# Patient Record
Sex: FEMALE | Race: BLACK OR AFRICAN AMERICAN | Employment: UNEMPLOYED | URBAN - METROPOLITAN AREA
[De-identification: names, ages, dates, MRNs, and addresses within clinical notes are randomized per-mention and may not be internally consistent; named-entity substitution may affect disease eponyms.]

---

## 2018-04-05 PROCEDURE — 99282 EMERGENCY DEPT VISIT SF MDM: CPT

## 2018-04-06 ENCOUNTER — APPOINTMENT (OUTPATIENT)
Dept: GENERAL RADIOLOGY | Age: 48
End: 2018-04-06
Attending: EMERGENCY MEDICINE
Payer: SELF-PAY

## 2018-04-06 ENCOUNTER — HOSPITAL ENCOUNTER (EMERGENCY)
Age: 48
Discharge: HOME | End: 2018-04-06
Attending: EMERGENCY MEDICINE
Payer: SELF-PAY

## 2018-04-06 VITALS
OXYGEN SATURATION: 99 % | HEART RATE: 110 BPM | SYSTOLIC BLOOD PRESSURE: 153 MMHG | RESPIRATION RATE: 17 BRPM | WEIGHT: 167 LBS | TEMPERATURE: 98.9 F | DIASTOLIC BLOOD PRESSURE: 100 MMHG

## 2018-04-06 DIAGNOSIS — S76.019A HIP STRAIN, INITIAL ENCOUNTER: Primary | ICD-10-CM

## 2018-04-06 PROCEDURE — 74011250637 HC RX REV CODE- 250/637: Performed by: EMERGENCY MEDICINE

## 2018-04-06 PROCEDURE — 73502 X-RAY EXAM HIP UNI 2-3 VIEWS: CPT

## 2018-04-06 RX ORDER — NAPROXEN 500 MG/1
500 TABLET ORAL 2 TIMES DAILY WITH MEALS
Qty: 20 TAB | Refills: 0 | Status: SHIPPED | OUTPATIENT
Start: 2018-04-06 | End: 2018-04-12

## 2018-04-06 RX ORDER — IBUPROFEN 400 MG/1
800 TABLET ORAL
Status: COMPLETED | OUTPATIENT
Start: 2018-04-06 | End: 2018-04-06

## 2018-04-06 RX ADMIN — IBUPROFEN 800 MG: 400 TABLET ORAL at 03:48

## 2018-04-06 NOTE — DISCHARGE INSTRUCTIONS
Hip Pain: Care Instructions  Your Care Instructions    Hip pain may be caused by many things, including overuse, a fall, or a twisting movement. Another cause of hip pain is arthritis. Your pain may increase when you stand up, walk, or squat. The pain may come and go or may be constant. Home treatment can help relieve hip pain, swelling, and stiffness. If your pain is ongoing, you may need more tests and treatment. Follow-up care is a key part of your treatment and safety. Be sure to make and go to all appointments, and call your doctor if you are having problems. It's also a good idea to know your test results and keep a list of the medicines you take. How can you care for yourself at home? · Take pain medicines exactly as directed. ¨ If the doctor gave you a prescription medicine for pain, take it as prescribed. ¨ If you are not taking a prescription pain medicine, ask your doctor if you can take an over-the-counter medicine. · Rest and protect your hip. Take a break from any activity, including standing or walking, that may cause pain. · Put ice or a cold pack against your hip for 10 to 20 minutes at a time. Try to do this every 1 to 2 hours for the next 3 days (when you are awake) or until the swelling goes down. Put a thin cloth between the ice and your skin. · Sleep on your healthy side with a pillow between your knees, or sleep on your back with pillows under your knees. · If there is no swelling, you can put moist heat, a heating pad, or a warm cloth on your hip. Do gentle stretching exercises to help keep your hip flexible. · Learn how to prevent falls. Have your vision and hearing checked regularly. Wear slippers or shoes with a nonskid sole. · Stay at a healthy weight. · Wear comfortable shoes. When should you call for help? Call 911 anytime you think you may need emergency care. For example, call if:  ? · You have sudden chest pain and shortness of breath, or you cough up blood.    ? · You are not able to stand or walk or bear weight. ? · Your buttocks, legs, or feet feel numb or tingly. ? · Your leg or foot is cool or pale or changes color. ? · You have severe pain. ?Call your doctor now or seek immediate medical care if:  ? · You have signs of infection, such as:  ¨ Increased pain, swelling, warmth, or redness in the hip area. ¨ Red streaks leading from the hip area. ¨ Pus draining from the hip area. ¨ A fever. ? · You have signs of a blood clot, such as:  ¨ Pain in your calf, back of the knee, thigh, or groin. ¨ Redness and swelling in your leg or groin. ? · You are not able to bend, straighten, or move your leg normally. ? · You have trouble urinating or having bowel movements. ? Watch closely for changes in your health, and be sure to contact your doctor if:  ? · You do not get better as expected. Where can you learn more? Go to http://cindy-vale.info/. Enter U424 in the search box to learn more about \"Hip Pain: Care Instructions. \"  Current as of: March 20, 2017  Content Version: 11.4  © 2314-7709 [employer]. Care instructions adapted under license by [employer] (which disclaims liability or warranty for this information). If you have questions about a medical condition or this instruction, always ask your healthcare professional. [place] [address] any warranty or liability for your use of this information.

## 2018-04-06 NOTE — ED TRIAGE NOTES
Left hip pain from fall down stairs in January. States she fell down 8 or 9 stairs.    Pt states due to pain her sugar is elevated

## 2018-04-06 NOTE — ED PROVIDER NOTES
EMERGENCY DEPARTMENT HISTORY AND PHYSICAL EXAM    1:34 AM      Date: 4/6/2018  Patient Name: Missy Meneses    History of Presenting Illness     No chief complaint on file. History Provided By: Patient    Chief Complaint: Hip pain   Duration: approx 4 Months  Timing:  Constant and Worsening  Location: Left sided  Modifying Factors: Has been taking Tylenol, applying patches and Epsom salt with no relief  Associated Symptoms: denies any other associated signs or symptoms      Additional History (Context): Missy Meneses is a 41 y.o. female with a pertinent history of DM, neuropathy, presenting to the ED c/o constant, left sided, hip pain x approx 4 months, worsening today. States she is prone to falls due to her diabetic neuropathy. Her most recent fall was approx 4 months ago. Has been taking Tylenol, applying patches and Epsom salt with no relief. No other acute symptoms or complaints were noted. PCP: None        Past History     Past Medical History:  Past Medical History:   Diagnosis Date    Diabetes [facility])        Past Surgical History:  No past surgical history on file. Family History:  No family history on file. Social History:  Social History   Substance Use Topics    Smoking status: Not on file    Smokeless tobacco: Not on file    Alcohol use Not on file       Allergies: Allergies not on file      Review of Systems       Review of Systems   Constitutional: Negative for chills and fever. Respiratory: Negative for shortness of breath. Cardiovascular: Negative for chest pain. Gastrointestinal: Negative for diarrhea, nausea and vomiting. Musculoskeletal:        L sided hip pain    All other systems reviewed and are negative. Physical Exam     Visit Vitals    /90 (BP 1 Location: Left arm)    Pulse (!) 118    Temp 98.9 °F (37.2 °C)    Resp 18    Wt 75.8 kg (167 lb)    SpO2 99%         Physical Exam   Constitutional: She is oriented to person, place, and time.  She appears well-developed and well-nourished. No distress. HENT:   Head: Normocephalic and atraumatic. Eyes: Conjunctivae and EOM are normal. Right eye exhibits no discharge. Left eye exhibits no discharge. No scleral icterus. Neck: Normal range of motion. Neck supple. No tracheal deviation present. Cardiovascular: Normal rate, regular rhythm and normal heart sounds. No murmur heard. Pulmonary/Chest: Effort normal and breath sounds normal. No respiratory distress. She has no wheezes. She has no rales. Abdominal: Soft. She exhibits no distension. There is no tenderness. There is no rebound and no guarding. Musculoskeletal: Normal range of motion. She exhibits no edema or deformity. Mild TTP left lower back   Neurological: She is alert and oriented to person, place, and time. No cranial nerve deficit. Skin: Skin is warm and dry. She is not diaphoretic. Psychiatric: She has a normal mood and affect. Her behavior is normal. Judgment and thought content normal.         Diagnostic Study Results     Labs -  No results found for this or any previous visit (from the past 12 hour(s)). Radiologic Studies -   XR HIP LT W OR WO PELV 2-3 VWS    (Results Pending)         Medical Decision Making   I am the first provider for this patient. I reviewed the vital signs, available nursing notes, past medical history, past surgical history, family history and social history. Vital Signs-Reviewed the patient's vital signs. Pulse Oximetry Analysis -  99% on room air (Interpretation)    Records Reviewed: Nursing Notes (Time of Review: 1:34 AM)    ED Course: Progress Notes, Reevaluation, and Consults:    Provider Notes (Medical Decision Making): Pt with persistent left low back and hip pain after injury 4 months ago. No acute findings on XR. Recommended pain control with Naprosyn and orthopedic f/u. Diagnosis     Clinical Impression:   1.  Hip strain, initial encounter        Disposition: Discharged Follow-up Information     Follow up With Details Comments [address] [facility] Lin Reyes Schedule an appointment as soon as possible for a visit  [address], [address] [facility] [address]    [facility]  If symptoms worsen [address] [facility] [address]  [phone]           Patient's Medications   Start Taking    NAPROXEN (NAPROSYN) 500 MG TABLET    Take 1 Tab by mouth two (2) times daily (with meals) for 10 days. Continue Taking    No medications on file   These Medications have changed    No medications on file   Stop Taking    No medications on file     _______________________________    Attestations:  Scribe Attestation     Neha Sahni acting as a scribe for and in the presence of Trudi Mo MD     April 06, 2018 at 1:34 AM       Provider Attestation:      I personally performed the services described in the documentation, reviewed the documentation, as recorded by the scribe in my presence, and it accurately and completely records my words and actions.  April 06, 2018 at 1:34 AM - Trudi Mo MD    _______________________________

## 2018-04-12 ENCOUNTER — OFFICE VISIT (OUTPATIENT)
Dept: FAMILY MEDICINE CLINIC | Facility: CLINIC | Age: 48
End: 2018-04-12

## 2018-04-12 VITALS
BODY MASS INDEX: 27.8 KG/M2 | SYSTOLIC BLOOD PRESSURE: 143 MMHG | WEIGHT: 173 LBS | OXYGEN SATURATION: 100 % | HEART RATE: 104 BPM | RESPIRATION RATE: 14 BRPM | DIASTOLIC BLOOD PRESSURE: 84 MMHG | HEIGHT: 66 IN | TEMPERATURE: 98.1 F

## 2018-04-12 DIAGNOSIS — E11.21 TYPE II DIABETES MELLITUS WITH NEPHROPATHY (HCC): Primary | ICD-10-CM

## 2018-04-12 DIAGNOSIS — R80.9 MICROALBUMINURIA: ICD-10-CM

## 2018-04-12 DIAGNOSIS — I10 HTN, GOAL BELOW 130/80: ICD-10-CM

## 2018-04-12 DIAGNOSIS — Z76.89 ENCOUNTER TO ESTABLISH CARE: ICD-10-CM

## 2018-04-12 DIAGNOSIS — M25.552 HIP PAIN, LEFT: ICD-10-CM

## 2018-04-12 DIAGNOSIS — G47.00 INSOMNIA, UNSPECIFIED TYPE: ICD-10-CM

## 2018-04-12 LAB
MICROALBUMIN UR TEST STR-MCNC: 150 MG/L
MICROALBUMIN/CREAT RATIO POC: >300 MG/G

## 2018-04-12 RX ORDER — CLONAZEPAM 1 MG/1
TABLET ORAL 2 TIMES DAILY
COMMUNITY
End: 2018-04-16 | Stop reason: SDUPTHER

## 2018-04-12 RX ORDER — DULOXETIN HYDROCHLORIDE 60 MG/1
60 CAPSULE, DELAYED RELEASE ORAL DAILY
Qty: 30 CAP | Refills: 3 | Status: SHIPPED | OUTPATIENT
Start: 2018-04-12

## 2018-04-12 RX ORDER — TRIAMCINOLONE ACETONIDE 1 MG/G
OINTMENT TOPICAL 2 TIMES DAILY
Qty: 30 G | Refills: 0 | Status: SHIPPED | OUTPATIENT
Start: 2018-04-12

## 2018-04-12 RX ORDER — AMLODIPINE BESYLATE 10 MG/1
10 TABLET ORAL DAILY
Qty: 30 TAB | Refills: 3 | Status: SHIPPED | OUTPATIENT
Start: 2018-04-12 | End: 2018-06-13

## 2018-04-12 RX ORDER — INSULIN GLARGINE 100 [IU]/ML
50 INJECTION, SOLUTION SUBCUTANEOUS
Qty: 5 ADJUSTABLE DOSE PRE-FILLED PEN SYRINGE | Refills: 11 | Status: SHIPPED | OUTPATIENT
Start: 2018-04-12

## 2018-04-12 RX ORDER — HYDROCODONE BITARTRATE AND ACETAMINOPHEN 7.5; 325 MG/1; MG/1
TABLET ORAL
COMMUNITY
End: 2018-04-16

## 2018-04-12 RX ORDER — TRAZODONE HYDROCHLORIDE 50 MG/1
50 TABLET ORAL
Qty: 30 TAB | Refills: 3 | Status: SHIPPED | OUTPATIENT
Start: 2018-04-12 | End: 2018-04-16

## 2018-04-12 RX ORDER — DICLOFENAC EPOLAMINE 0.01 G/1
1 PATCH TOPICAL EVERY 12 HOURS
COMMUNITY

## 2018-04-12 RX ORDER — GLIPIZIDE 10 MG/1
10 TABLET ORAL 2 TIMES DAILY
Qty: 60 TAB | Refills: 3 | Status: SHIPPED | OUTPATIENT
Start: 2018-04-12 | End: 2018-06-13 | Stop reason: SDUPTHER

## 2018-04-12 RX ORDER — INSULIN GLARGINE 100 [IU]/ML
45 INJECTION, SOLUTION SUBCUTANEOUS
COMMUNITY
End: 2018-04-12 | Stop reason: SDUPTHER

## 2018-04-12 RX ORDER — GABAPENTIN 600 MG/1
TABLET ORAL 3 TIMES DAILY
COMMUNITY
End: 2018-04-16

## 2018-04-12 RX ORDER — METHOCARBAMOL 500 MG/1
500 TABLET, FILM COATED ORAL 3 TIMES DAILY
Qty: 90 TAB | Refills: 3 | Status: SHIPPED | OUTPATIENT
Start: 2018-04-12 | End: 2018-05-09 | Stop reason: ALTCHOICE

## 2018-04-12 NOTE — PROGRESS NOTES
HISTORY OF PRESENT ILLNESS  Merlinda Busman is a 41 y.o. female. HPI Comments: New pt to practice. Hip pain: c/o left hip pain x 4 months. Reports falling down 3 flights of stairs 4 months ago. Recent ED visit with negative left hip xray. She has tried OTC Nsaids with no relief. Denies radiation of pain. HTN: reports she is not hypertensive. Not currently medicated. DM: unstable, reports med compliance with insulin. She has seen an endocrinologist in the past in [place]. States \" was on the wrong insulin for years\". Not compliant with home BG monitoring. H/o DM neuropathy, has tried gabapentin in the past with no relief. C/o trouble staying asleep. States \" I have not slept in 3 months\", she has not tried any treatment in the past       Establish Care   The history is provided by the patient. This is a new problem. Hip Injury    The history is provided by the patient. This is a new problem. The current episode started more than 1 week ago. The problem occurs constantly. The problem has not changed since onset. The pain is present in the left hip. The quality of the pain is described as aching. The pain is at a severity of 8/10. The pain is moderate. Associated symptoms include stiffness and tingling (numbness to both feet). Pertinent negatives include no numbness. The symptoms are aggravated by movement. She has tried OTC pain medications for the symptoms. The treatment provided no relief. There has been a history of trauma. Diabetes   The history is provided by the patient. This is a chronic problem. The current episode started more than 1 week ago. The problem occurs constantly. The problem has been rapidly worsening. Treatments tried: lantus. The treatment provided no relief. Review of Systems   Constitutional: Negative. HENT: Negative. Eyes: Negative. Respiratory: Negative. Cardiovascular: Negative. Genitourinary: Negative. Musculoskeletal: Positive for stiffness. Neurological: Positive for tingling (numbness to both feet). Negative for numbness. Psychiatric/Behavioral: Negative. Past Medical History:   Diagnosis Date    Anxiety     Diabetes ([address]     Miscarriage     x 2    Neuropathy     Stillborn, abnormal      History reviewed. No pertinent surgical history. No current outpatient prescriptions on file prior to visit. No current facility-administered medications on file prior to visit. Allergies and Intolerances: Allergies   Allergen Reactions    Lyrica [Pregabalin] Anaphylaxis     suicidal thoughts    Ibuprofen Swelling     legs       Family History:   Family History   Problem Relation Age of Onset    Diabetes Mother     Heart Attack Mother     Cancer Father     Diabetes Sister     Diabetes Maternal Grandmother     Diabetes Paternal Grandmother     Arthritis-rheumatoid Paternal Grandmother        Social History:   She  reports that she has been smoking. She has never used smokeless tobacco. She  reports that she drinks alcohol. Vitals:   Visit Vitals    /84    Pulse (!) 104    Temp 98.1 °F (36.7 °C)    Resp 14    Ht 5' 5.5\" (1.664 m)    Wt 173 lb (78.5 kg)    LMP 04/07/2018    SpO2 100%    BMI 28.35 kg/m2     Body surface area is 1.9 meters squared.   Recent Results (from the past 12 hour(s))   AMB POC URINE, MICROALBUMIN, SEMIQUANTITATIVE    Collection Time: 04/12/18  3:00 PM   Result Value Ref Range    Microalbumin urine (POC) 150 MG/L    Microalbumin/creat ratio (POC) >300 <30 MG/G     Recent Results (from the past 24 hour(s))   AMB POC URINE, MICROALBUMIN, SEMIQUANTITATIVE    Collection Time: 04/12/18  3:00 PM   Result Value Ref Range    Microalbumin urine (POC) 150 MG/L    Microalbumin/creat ratio (POC) >300 <30 MG/G   AMB POC GLUCOSE, QUANTITATIVE, BLOOD    Collection Time: 04/12/18  3:30 PM   Result Value Ref Range    Glucose POC Hi mg/dL     Diabetic foot exam:     Left: Filament test absent sensation with micro filament   Pulse DP: 2+ (normal)   Pulse PT: 2+ (normal)   Deformities: Yes - callus, cracked heel  Right: Filament test 2/6   Pulse DP: 2+ (normal)   Pulse PT: 2+ (normal)   Deformities: None        Physical Exam   Constitutional: She is oriented to person, place, and time. She appears well-developed and well-nourished. HENT:   Head: Atraumatic. Cardiovascular: Normal rate. Pulmonary/Chest: Effort normal and breath sounds normal.   Musculoskeletal:        Left hip: She exhibits tenderness. She exhibits normal range of motion, normal strength, no bony tenderness and no swelling. Neurological: She is alert and oriented to person, place, and time. Skin: Skin is warm. Psychiatric: She has a normal mood and affect. Her behavior is normal.   Nursing note and vitals reviewed. ASSESSMENT and PLAN    ICD-10-CM ICD-9-CM    1. Type II diabetes mellitus with nephropathy (HCC) E11.21 250.40 AMB POC URINE, MICROALBUMIN, SEMIQUANTITATIVE     583.81 REFERRAL TO NEPHROLOGY      insulin nph-regular human rec (HUMULIN 70/30 U-100 KWIKPEN) 100 unit/mL (70-30) inpn      insulin glargine (LANTUS SOLOSTAR U-100 INSULIN) 100 unit/mL (3 mL) inpn      glipiZIDE (GLUCOTROL) 10 mg tablet      AMB POC GLUCOSE, QUANTITATIVE, BLOOD      HM DIABETES FOOT EXAM      CANCELED: AMB POC HEMOGLOBIN A1C   2. HTN, goal below 130/80 I10 401.9 amLODIPine (NORVASC) 10 mg tablet   3. Encounter to establish care Z76.89 V65.8    4. Microalbuminuria R80.9 791.0 REFERRAL TO NEPHROLOGY   5. Hip pain, left M25.552 719.45 methocarbamol (ROBAXIN) 500 mg tablet      REFERRAL TO ORTHOPEDICS   6. Insomnia, unspecified type G47.00 780.52 traZODone (DESYREL) 50 mg tablet   7.  DM type 2, uncontrolled, with neuropathy (HCC) E11.40 250.62 DULoxetine (CYMBALTA) 60 mg capsule    E11.65 357.2 AMB POC GLUCOSE, QUANTITATIVE, BLOOD      HM DIABETES FOOT EXAM     Follow-up Disposition:  Return in about 4 weeks (around 5/10/2018), or if symptoms worsen or fail to improve, for HTN, DM.  lab results and schedule of future lab studies reviewed with patient  cardiovascular risk and specific lipid/LDL goals reviewed  reviewed medications and side effects in detail  specific diabetic recommendations: diabetic diet discussed in detail, written exchange diet given, low cholesterol diet, weight control and daily exercise discussed, home glucose monitoring emphasized, all medications, side effects and compliance discussed carefully, use and side effects of insulin is taught, foot care discussed and Podiatry visits discussed, annual eye examinations at Ophthalmology discussed, glycohemoglobin and other lab monitoring discussed and long term diabetic complications discussed     - Alarm signals discussed. ER precautions  - Plan of care reviewed with patient. Understanding verbalized and they are in agreement with plan of care.

## 2018-04-12 NOTE — MR AVS SNAPSHOT
Lindsey Price 
 
 
 [address] [facility] [address] [facility] [address] 
[phone] Patient: Brent Cuevas MRN: [id number] Visit Information Date & Time Provider Department Dept. Phone Encounter #  
 4/12/2018  2:30 PM Chandan Mullins NP [employer] [phone] Follow-up Instructions Return in about 4 weeks (around 5/10/2018), or if symptoms worsen or fail to improve, for HTN, DM. Upcoming Health Maintenance Date Due DTaP/Tdap/Td series (1 - Tdap) 8/20/1993 PAP AKA CERVICAL CYTOLOGY 8/20/1993 Influenza Age 5 to Adult 8/1/2017 Allergies as of 4/12/2018  Review Complete On: 4/12/2018 By: Azael Rasmussen Severity Noted Reaction Type Reactions Lyrica [Pregabalin] High 04/12/2018    Anaphylaxis  
 suicidal thoughts Ibuprofen  04/12/2018    Swelling  
 legs Current Immunizations  Never Reviewed No immunizations on file. Not reviewed this visit You Were Diagnosed With   
  
 Codes Comments Type II diabetes mellitus with nephropathy (HCC)    -  Primary ICD-10-CM: E11.21 
ICD-9-CM: 250.40, 583.81   
 HTN, goal below 130/80     ICD-10-CM: I10 
ICD-9-CM: 401.9 Encounter to establish care     ICD-10-CM: Z76.89 
ICD-9-CM: V65.8 Microalbuminuria     ICD-10-CM: R80.9 ICD-9-CM: 791.0 Hip pain, left     ICD-10-CM: M25.552 ICD-9-CM: 719.45 Insomnia, unspecified type     ICD-10-CM: G47.00 ICD-9-CM: 780.52 Neuropathy     ICD-10-CM: G62.9 ICD-9-CM: 547. 9 Vitals BP Pulse Temp Resp Height(growth percentile) Weight(growth percentile) 143/84 (!) 104 98.1 °F (36.7 °C) 14 5' 5.5\" (1.664 m) 173 lb (78.5 kg) LMP SpO2 BMI OB Status Smoking Status 04/07/2018 100% 28.35 kg/m2 Having regular periods Current Every Day Smoker Vitals History BMI and BSA Data Body Mass Index Body Surface Area  
 28.35 kg/m 2 1.9 m 2 Preferred Pharmacy Pharmacy Name Phone [facility] [address] [facility] [place], [address] [facility] [phone] Your Updated Medication List  
  
   
This list is accurate as of 4/12/18  3:37 PM.  Always use your most recent med list. amLODIPine 10 mg tablet Commonly known as:  Dane Raddle Take 1 Tab by mouth daily. diclofenac 1.3 % Pt12 Commonly known as:  FLECTOR  
1 Patch by TransDERmal route every twelve (12) hours every twelve (12) hours. DULoxetine 60 mg capsule Commonly known as:  CYMBALTA Take 1 Cap by mouth daily. gabapentin 600 mg tablet Commonly known as:  NEURONTIN Take  by mouth three (3) times daily. glipiZIDE 10 mg tablet Commonly known as:  [employer] Take 1 Tab by mouth two (2) times a day. HYDROcodone-acetaminophen 7.5-325 mg per tablet Commonly known as:  Leisa Orozco Take  by mouth. insulin glargine 100 unit/mL (3 mL) Inpn Commonly known as:  LANTUS SOLOSTAR U-100 INSULIN  
50 Units by SubCUTAneous route nightly. insulin nph-regular human rec 100 unit/mL (70-30) Inpn Commonly known as:  HumuLIN 70/30 U-100 KwikPen 15 Units by SubCUTAneous route two (2) times a day and at bedtime. KlonoPIN 1 mg tablet Generic drug:  clonazePAM  
Take  by mouth two (2) times a day. methocarbamol 500 mg tablet Commonly known as:  ROBAXIN Take 1 Tab by mouth three (3) times daily. traZODone 50 mg tablet Commonly known as:  Cathy Britta Take 1 Tab by mouth nightly. triamcinolone acetonide 0.1 % ointment Commonly known as:  KENALOG Apply  to affected area two (2) times a day. use thin layer Prescriptions Sent to Pharmacy Refills  
 insulin nph-regular human rec (HUMULIN 70/30 U-100 KWIKPEN) 100 unit/mL (70-30) inpn 11 Sig: 15 Units by SubCUTAneous route two (2) times a day and at bedtime.   
 Class: Normal  
 Pharmacy: [address] Ph #: [phone] Route: SubCUTAneous  
 insulin glargine (LANTUS SOLOSTAR U-100 INSULIN) 100 unit/mL (3 mL) inpn 11 Si Units by SubCUTAneous route nightly. Class: Normal  
 Pharmacy: [facility] [address] LIDYA BOO [address] [place] [address] [address] [facility] Ph #: [phone] Route: SubCUTAneous DULoxetine (CYMBALTA) 60 mg capsule 3 Sig: Take 1 Cap by mouth daily. Class: Normal  
 Pharmacy: [facility] [address] LIDYA BOO [address] [place] [address] [facility] Ph #: [phone] Route: Oral  
 traZODone (DESYREL) 50 mg tablet 3 Sig: Take 1 Tab by mouth nightly. Class: Normal  
 Pharmacy: [facility] [address] LIDYA BOO [address] [place] [address] [facility] Ph #: [phone] Route: Oral  
 glipiZIDE (GLUCOTROL) 10 mg tablet 3 Sig: Take 1 Tab by mouth two (2) times a day. Class: Normal  
 Pharmacy: [facility] [address] LIDYA BOO [address] [place] [address] [facility] Ph #: [phone] Route: Oral  
 triamcinolone acetonide (KENALOG) 0.1 % ointment 0 Sig: Apply  to affected area two (2) times a day. use thin layer Class: Normal  
 Pharmacy: [facility] [address] LIDYA BOO [address] [place] [address] [facility] Ph #: [phone] Route: Topical  
 amLODIPine (NORVASC) 10 mg tablet 3 Sig: Take 1 Tab by mouth daily. Class: Normal  
 Pharmacy: [facility] [address] LIDYA BOO [address] [place] [address] [facility] Ph #: [phone] Route: Oral  
 methocarbamol (ROBAXIN) 500 mg tablet 3 Sig: Take 1 Tab by mouth three (3) times daily. Class: Normal  
 Pharmacy: [facility] [address] LIDYA BOO [address] [place] [address] #: [phone] Route: Oral  
  
We Performed the Following AMB POC URINE, MICROALBUMIN, SEMIQUANTITATIVE [16757 CPT(R)] REFERRAL TO NEPHROLOGY [BZH97 Custom] Follow-up Instructions Return in about 4 weeks (around 5/10/2018), or if symptoms worsen or fail to improve, for HTN, DM. Referral Information Referral ID Referred By Referred To  
  
 [id number] Hemant Harold Beatris Goodell, MD   
   [address] [facility] [address] Phone: [phone] Fax: [phone] Visits Status Start Date End Date 1 New Request 4/12/18 4/12/19 If your referral has a status of pending review or denied, additional information will be sent to support the outcome of this decision. Patient Instructions Learning About Foot and Toenail Care Checking your loved one's feet and keeping them clean and soft can help prevent cracks and infection in the skin. This is especially important for people who have diabetes. Keeping toenails trimmed-and polished if that's what the person likes-also helps the person feel well-groomed. If the person you care for has diabetes or has foot problems, such as bad bunions and corns, think about taking them to see a podiatrist. This is a doctor who specializes in the care of the feet. Sometimes a podiatrist will come to the home if the person can't go out for visits. Try to take the person for salon pedicures if that is what they want. It's a chance to get out and see people and continue a favorite activity. You can do basic nail care at home. Usually all you need to do is keep the nails clean and at a safe length. How do you trim someone's toenails? Try to trim the person's nails every week. Or check the nails each week to see if they need to be trimmed. It's easiest to trim nails after the person has had a shower or foot bath. It makes the nails softer and easier to trim. Start by gathering your supplies. You will need toenail clippers and a nail file. You may also need nail polish and nail polish remover. To trim the nails: 
1. Wash and dry your hands. You don't need to wear gloves. 2. Use nail polish remover to take off any polish. 3. Hold the person's foot and toe steady with one hand while you trim the nail with your other hand. Trim the nails straight across. Leave the nails a little longer at the corners so that the sharp ends don't cut into the skin. 4. Keep the nails no longer than the tip of the toes. 5. Let the nails dry if they are still damp and soft. 6. Use a nail file to gently smooth the edges of the nails, especially at the corners. They may be sharp after the nails are cut straight. 7. Apply nail polish, if the person wants it. If the person's nails are thick and discolored, it may be safest to have a podiatrist cut them. What else do you need to know? When you're caring for someone's nails, it is important to remember not to trim or cut the cuticles. A minor cut in a cuticle could lead to an infection. Wash the feet daily in the shower or bath or in a basin made for washing feet. It's extra important to wash the feet carefully if the person has diabetes. After washing the feet, dry gently. Put lotion on the feet, especially on the heels. But don't put it between the toes. If the person doesn't have diabetes and you see signs of athlete's foot (such as dry, cracking, or itchy skin between the toes), you can try an over-the-counter medicine. These medicines can kill the fungus that causes athlete's foot. If the problem doesn't go away, talk to the person's doctor. Look every day for cuts or signs of infection, such as pain, swelling, redness, or warmth. If you see any of these signs-especially in someone who has diabetes-call the doctor. Where can you learn more? Go to http://cindy-vale.info/. Enter A726 in the search box to learn more about \"Learning About Foot and Toenail Care. \" Current as of: September 24, 2016 Content Version: 11.4 © 6310-7098 [employer]Out.  Care instructions adapted under license by [employer] (which disclaims liability or warranty for this information). If you have questions about a medical condition or this instruction, always ask your healthcare professional. [address] any warranty or liability for your use of this information. Diabetes Blood Sugar Emergencies: Your Action Plan How can you prevent a blood sugar emergency? An important part of living with diabetes is keeping your blood sugar in your target range. You'll need to know what to do if it's too high or too low. Managing your blood sugar levels helps you avoid emergencies. This care sheet will teach you about the signs of high and low blood sugar. It will help you make an action plan with your doctor for when these signs occur. Low blood sugar is more likely to happen if you take certain medicines for diabetes. It can also happen if you skip a meal, drink alcohol, or exercise more than usual. 
You may get high blood sugar if you eat differently than you normally do. One example is eating more carbohydrate than usual. Having a cold, the flu, or other sudden illness can also cause high blood sugar levels. Levels can also rise if you miss a dose of medicine. Any change in how you take your medicine may affect your blood sugar level. So it's important to work with your doctor before you make any changes. Check your blood sugar Work with your doctor to fill in the blank spaces below that apply to you. Track your levels, know your target range, and write down ways you can get your blood sugar back in your target range. A log book can help you track your levels. Take the book to all of your medical appointments. · Check your blood sugar _____ times a day, at these times:________________________________________________. (For example: Before meals, at bedtime, before exercise, during exercise, other.) · Your blood sugar target range before a meal is ___________________. Your blood sugar target range after a meal is _______________________. · Do dsai-___________________________________________________-tk get your blood sugar back within your safe range if your blood sugar results are _________________________________________. (For example: Less than 70 or above 250 mg/dL.) Call your doctor when your blood sugar results are ___________________________________. (For example: Less than 70 or above 250 mg/dL.) What are the symptoms of low and high blood sugar? Common symptoms of low blood sugar are sweating and feeling shaky, weak, hungry, or confused. Symptoms can start quickly. Common symptoms of high blood sugar are feeling very thirsty or very hungry. You may also pass urine more often than usual. You may have blurry vision and may lose weight without trying. But some people may have high or low blood sugar without having any symptoms. That's a good reason to check your blood sugar on a regular schedule. What should you do if you have symptoms? Work with your doctor to fill in the blank spaces below that apply to you. Low blood sugar If you have symptoms of low blood sugar, check your blood sugar. If it's below _____ ( for example, below 70), eat or drink a quick-sugar food that has about 15 grams of carbohydrate. Your goal is to get your level back to your safe range. Check your blood sugar again 15 minutes later. If it's still not in your target range, take another 15 grams of carbohydrate and check your blood sugar again in 15 minutes. Repeat this until you reach your target. Then go back to your regular testing schedule. When you have low blood sugar, it's best to stop or reduce any physical activity until your blood sugar is back in your target range and is stable. If you must stay active, eat or drink 30 grams of carbohydrate. Then check your blood sugar again in 15 minutes. If it's not in your target range, take another 30 grams of carbohydrates. Check your blood sugar again in 15 minutes. Keep doing this until you reach your target. You can then go back to your regular testing schedule. If your symptoms or blood sugar levels are getting worse or have not improved after 15 minutes, seek medical care right away. Here are some examples of quick-sugar foods with 15 grams of carbohydrate: · 3 or 4 glucose tablets · 1 tube of glucose gel · Hard candy (such as 3 Jolly Ranchers or 5 to [employer]) · ½ cup to ¾ cup (4 to 6 ounces) of fruit juice or regular (not diet) soda High blood sugar If you have symptoms of high blood sugar, check your blood sugar. Your goal is to get your level back to your target range. If it's above ______ ( for example, above 250), follow these steps: · If you missed a dose of your diabetes medicine, take it now. Take only the amount of medicine that you have been prescribed. Do not take more or less medicine. · Give yourself insulin if your doctor has prescribed it for high blood sugar. · Test for ketones, if the doctor told you to do so. If the results of the ketone test show a moderate-to-large amount of ketones, call the doctor for advice. · Wait 30 minutes after you take the extra insulin or the missed medicine. Check your blood sugar again. If your symptoms or blood sugar levels are getting worse or have not improved after taking these steps, seek medical care right away. Follow-up care is a key part of your treatment and safety. Be sure to make and go to all appointments, and call your doctor if you are having problems. It's also a good idea to know your test results and keep a list of the medicines you take. Where can you learn more? Go to http://cindy-vale.info/. Enter O281 in the search box to learn more about \"Diabetes Blood Sugar Emergencies: Your Action Plan. \" Current as of: March 13, 2017 Content Version: 11.4 © 4754-8714 Healthwise, [employer]Post.  Care instructions adapted under license by [employer]sg[employer]nr (which disclaims liability or warranty for this information). If you have questions about a medical condition or this instruction, always ask your healthcare professional. [address] any warranty or liability for your use of this information. Introducing [facility] SERVICES! [facility] introduces Chipolo patient portal. Now you can access parts of your medical record, email your doctor's office, and request medication refills online. 1. In your internet browser, go to [email or web address] 2. Click on the First Time User? Click Here link in the Sign In box. You will see the New Member Sign Up page. 3. Enter your Chipolo Access Code exactly as it appears below. You will not need to use this code after youve completed the sign-up process. If you do not sign up before the expiration date, you must request a new code. · Chipolo Access Code: [email or web address] Expires: 7/5/2018 12:26 AM 
 
4. Enter the last four digits of your Social Security Number (xxxx) and Date of Birth (mm/dd/yyyy) as indicated and click Submit. You will be taken to the next sign-up page. 5. Create a Chipolo ID. This will be your Chipolo login ID and cannot be changed, so think of one that is secure and easy to remember. 6. Create a Chipolo password. You can change your password at any time. 7. Enter your Password Reset Question and Answer. This can be used at a later time if you forget your password. 8. Enter your e-mail address. You will receive e-mail notification when new information is available in [address] 9. Click Sign Up. You can now view and download portions of your medical record. 10. Click the Download Summary menu link to download a portable copy of your medical information. If you have questions, please visit the Frequently Asked Questions section of the Chipolo website. Remember, Chipolo is NOT to be used for urgent needs. For medical emergencies, dial 911. Now available from your iPhone and Android! Please provide this summary of care documentation to your next provider. Your primary care clinician is listed as NONE. If you have any questions after today's visit, please call [phone].

## 2018-04-12 NOTE — PATIENT INSTRUCTIONS
Learning About Foot and Toenail Care  Checking your loved one's feet and keeping them clean and soft can help prevent cracks and infection in the skin. This is especially important for people who have diabetes. Keeping toenails trimmed-and polished if that's what the person likes-also helps the person feel well-groomed. If the person you care for has diabetes or has foot problems, such as bad bunions and corns, think about taking them to see a podiatrist. This is a doctor who specializes in the care of the feet. Sometimes a podiatrist will come to the home if the person can't go out for visits. Try to take the person for salon pedicures if that is what they want. It's a chance to get out and see people and continue a favorite activity. You can do basic nail care at home. Usually all you need to do is keep the nails clean and at a safe length. How do you trim someone's toenails? Try to trim the person's nails every week. Or check the nails each week to see if they need to be trimmed. It's easiest to trim nails after the person has had a shower or foot bath. It makes the nails softer and easier to trim. Start by gathering your supplies. You will need toenail clippers and a nail file. You may also need nail polish and nail polish remover. To trim the nails:  1. Wash and dry your hands. You don't need to wear gloves. 2. Use nail polish remover to take off any polish. 3. Hold the person's foot and toe steady with one hand while you trim the nail with your other hand. Trim the nails straight across. Leave the nails a little longer at the corners so that the sharp ends don't cut into the skin. 4. Keep the nails no longer than the tip of the toes. 5. Let the nails dry if they are still damp and soft. 6. Use a nail file to gently smooth the edges of the nails, especially at the corners. They may be sharp after the nails are cut straight. 7. Apply nail polish, if the person wants it.   If the person's nails are thick and discolored, it may be safest to have a podiatrist cut them. What else do you need to know? When you're caring for someone's nails, it is important to remember not to trim or cut the cuticles. A minor cut in a cuticle could lead to an infection. Wash the feet daily in the shower or bath or in a basin made for washing feet. It's extra important to wash the feet carefully if the person has diabetes. After washing the feet, dry gently. Put lotion on the feet, especially on the heels. But don't put it between the toes. If the person doesn't have diabetes and you see signs of athlete's foot (such as dry, cracking, or itchy skin between the toes), you can try an over-the-counter medicine. These medicines can kill the fungus that causes athlete's foot. If the problem doesn't go away, talk to the person's doctor. Look every day for cuts or signs of infection, such as pain, swelling, redness, or warmth. If you see any of these signs-especially in someone who has diabetes-call the doctor. Where can you learn more? Go to http://cindy-vale.info/. Enter A726 in the search box to learn more about \"Learning About Foot and Toenail Care. \"  Current as of: September 24, 2016  Content Version: 11.4  © 1304-9079 [employer]. Care instructions adapted under license by [employer] (which disclaims liability or warranty for this information). If you have questions about a medical condition or this instruction, always ask your healthcare professional. [place] [address] any warranty or liability for your use of this information. Diabetes Blood Sugar Emergencies: Your Action Plan  How can you prevent a blood sugar emergency? An important part of living with diabetes is keeping your blood sugar in your target range. You'll need to know what to do if it's too high or too low. Managing your blood sugar levels helps you avoid emergencies.  This care sheet will teach you about the signs of high and low blood sugar. It will help you make an action plan with your doctor for when these signs occur. Low blood sugar is more likely to happen if you take certain medicines for diabetes. It can also happen if you skip a meal, drink alcohol, or exercise more than usual.  You may get high blood sugar if you eat differently than you normally do. One example is eating more carbohydrate than usual. Having a cold, the flu, or other sudden illness can also cause high blood sugar levels. Levels can also rise if you miss a dose of medicine. Any change in how you take your medicine may affect your blood sugar level. So it's important to work with your doctor before you make any changes. Check your blood sugar  Work with your doctor to fill in the blank spaces below that apply to you. Track your levels, know your target range, and write down ways you can get your blood sugar back in your target range. A log book can help you track your levels. Take the book to all of your medical appointments. · Check your blood sugar _____ times a day, at these times:________________________________________________. (For example: Before meals, at bedtime, before exercise, during exercise, other.)  · Your blood sugar target range before a meal is ___________________. Your blood sugar target range after a meal is _______________________. · Do dcls-___________________________________________________-er get your blood sugar back within your safe range if your blood sugar results are _________________________________________. (For example: Less than 70 or above 250 mg/dL.)  Call your doctor when your blood sugar results are ___________________________________. (For example: Less than 70 or above 250 mg/dL.)  What are the symptoms of low and high blood sugar? Common symptoms of low blood sugar are sweating and feeling shaky, weak, hungry, or confused. Symptoms can start quickly.   Common symptoms of high blood sugar are feeling very thirsty or very hungry. You may also pass urine more often than usual. You may have blurry vision and may lose weight without trying. But some people may have high or low blood sugar without having any symptoms. That's a good reason to check your blood sugar on a regular schedule. What should you do if you have symptoms? Work with your doctor to fill in the blank spaces below that apply to you. Low blood sugar  If you have symptoms of low blood sugar, check your blood sugar. If it's below _____ ( for example, below 70), eat or drink a quick-sugar food that has about 15 grams of carbohydrate. Your goal is to get your level back to your safe range. Check your blood sugar again 15 minutes later. If it's still not in your target range, take another 15 grams of carbohydrate and check your blood sugar again in 15 minutes. Repeat this until you reach your target. Then go back to your regular testing schedule. When you have low blood sugar, it's best to stop or reduce any physical activity until your blood sugar is back in your target range and is stable. If you must stay active, eat or drink 30 grams of carbohydrate. Then check your blood sugar again in 15 minutes. If it's not in your target range, take another 30 grams of carbohydrates. Check your blood sugar again in 15 minutes. Keep doing this until you reach your target. You can then go back to your regular testing schedule. If your symptoms or blood sugar levels are getting worse or have not improved after 15 minutes, seek medical care right away. Here are some examples of quick-sugar foods with 15 grams of carbohydrate:  · 3 or 4 glucose tablets  · 1 tube of glucose gel  · Hard candy (such as 3 Jolly Ranchers or 5 to 7 Life Savers)  · ½ cup to ¾ cup (4 to 6 ounces) of fruit juice or regular (not diet) soda  High blood sugar  If you have symptoms of high blood sugar, check your blood sugar.  Your goal is to get your level back to your target range. If it's above ______ ( for example, above 250), follow these steps:  · If you missed a dose of your diabetes medicine, take it now. Take only the amount of medicine that you have been prescribed. Do not take more or less medicine. · Give yourself insulin if your doctor has prescribed it for high blood sugar. · Test for ketones, if the doctor told you to do so. If the results of the ketone test show a moderate-to-large amount of ketones, call the doctor for advice. · Wait 30 minutes after you take the extra insulin or the missed medicine. Check your blood sugar again. If your symptoms or blood sugar levels are getting worse or have not improved after taking these steps, seek medical care right away. Follow-up care is a key part of your treatment and safety. Be sure to make and go to all appointments, and call your doctor if you are having problems. It's also a good idea to know your test results and keep a list of the medicines you take. Where can you learn more? Go to http://cindy-vale.info/. Enter J459 in the search box to learn more about \"Diabetes Blood Sugar Emergencies: Your Action Plan. \"  Current as of: March 13, 2017  Content Version: 11.4  © 5105-2533 Healthwise, Incorporated. Care instructions adapted under license by [employer] (which disclaims liability or warranty for this information). If you have questions about a medical condition or this instruction, always ask your healthcare professional. [place] [address] any warranty or liability for your use of this information.

## 2018-04-12 NOTE — PROGRESS NOTES
1. Have you been to the ER, urgent care clinic since your last visit? Hospitalized since your last visit? Yes When: 04/06/18 [facility] left hip strain    2. Have you seen or consulted any other health care providers outside of the [address] [place] [address] since your last visit? Include any pap smears or colon screening.  No

## 2018-04-13 LAB — GLUCOSE POC: NORMAL MG/DL

## 2018-04-16 ENCOUNTER — OFFICE VISIT (OUTPATIENT)
Dept: FAMILY MEDICINE CLINIC | Facility: CLINIC | Age: 48
End: 2018-04-16

## 2018-04-16 VITALS
RESPIRATION RATE: 14 BRPM | SYSTOLIC BLOOD PRESSURE: 158 MMHG | OXYGEN SATURATION: 100 % | DIASTOLIC BLOOD PRESSURE: 96 MMHG | HEART RATE: 112 BPM | BODY MASS INDEX: 27.48 KG/M2 | HEIGHT: 66 IN | TEMPERATURE: 98.1 F | WEIGHT: 171 LBS

## 2018-04-16 DIAGNOSIS — T78.40XA ALLERGIC REACTION, INITIAL ENCOUNTER: ICD-10-CM

## 2018-04-16 DIAGNOSIS — G47.00 INSOMNIA, UNSPECIFIED TYPE: ICD-10-CM

## 2018-04-16 DIAGNOSIS — I10 HTN, GOAL BELOW 130/80: Primary | ICD-10-CM

## 2018-04-16 DIAGNOSIS — F41.9 ANXIETY: ICD-10-CM

## 2018-04-16 RX ORDER — CLONAZEPAM 1 MG/1
1 TABLET ORAL
Qty: 60 TAB | Refills: 0 | Status: SHIPPED | OUTPATIENT
Start: 2018-04-16 | End: 2018-05-09 | Stop reason: SDUPTHER

## 2018-04-16 RX ORDER — ZOLPIDEM TARTRATE 10 MG/1
10 TABLET ORAL
Qty: 30 TAB | Refills: 0 | Status: CANCELLED | OUTPATIENT
Start: 2018-04-16

## 2018-04-16 NOTE — MR AVS SNAPSHOT
[facility] 
 
 
 [address] [facility] [address] Located within [facility] [address] 
[phone] Patient: Karissa Ramirez MRN: [id number] Visit Information Date & Time Provider Department Dept. Phone Encounter #  
 4/16/2018  1:00 PM Yodit Brown NP [facility] [phone] [id number] Follow-up Instructions Return if symptoms worsen or fail to improve. Your Appointments 5/2/2018  2:00 PM  
New Patient with Guero Boucher MD  
[address] [facility], [address] [facility] - [facility] Appt Note: LEFT HIP PAIN/ REF BY JUAN GUZMANISON/ XRAYS-Y IN CC/*ADVISED PT TO COME EARLY W. PHOTO ID & INS. CARD, CURRENT MEDICATION LIST & DOSAGE TO THE HS LOCATION  
 [address] [place] [address], [address] [place] [address]  
[phone] [address] [address]  
  
    
 5/9/2018  2:15 PM  
ROUTINE CARE with Yodit Brown NP [facility] Main Office ([place] [employer] [facility] Appt Note: 4 week follow up appt. for HTN, DM.  
 [address] [facility] [address] [facility] [address]  
[phone]  
  
   
 [address] Upcoming Health Maintenance Date Due  
 LIPID PANEL Q1 1972 EYE EXAM RETINAL OR DILATED Q1 8/20/1982 Pneumococcal 19-64 Medium Risk (1 of 1 - PPSV23) 8/20/1991 DTaP/Tdap/Td series (1 - Tdap) 8/20/1993 PAP AKA CERVICAL CYTOLOGY 8/20/1993 Influenza Age 5 to Adult 8/1/2017 HEMOGLOBIN A1C Q6M 6/22/2018* FOOT EXAM Q1 4/12/2019 MICROALBUMIN Q1 4/12/2019 *Topic was postponed. The date shown is not the original due date. Allergies as of 4/16/2018  Review Complete On: 4/16/2018 By: Tess Villar Severity Noted Reaction Type Reactions Lyrica [Pregabalin] High 04/12/2018    Anaphylaxis  
 suicidal thoughts Ibuprofen  04/12/2018    Swelling  
 legs Trazodone  04/16/2018    Other (comments) Loose bowel movents Current Immunizations  Never Reviewed No immunizations on file. Not reviewed this visit You Were Diagnosed With   
  
 Codes Comments HTN, goal below 130/80    -  Primary ICD-10-CM: I10 
ICD-9-CM: 401.9 Insomnia, unspecified type     ICD-10-CM: G47.00 ICD-9-CM: 780.52 Allergic reaction, initial encounter     ICD-10-CM: T78.40XA ICD-9-CM: 995.3 Anxiety     ICD-10-CM: F41.9 ICD-9-CM: 300.00 Vitals BP Pulse Temp Resp Height(growth percentile) Weight(growth percentile) (!) 158/96 (!) 112 98.1 °F (36.7 °C) 14 5' 5.5\" (1.664 m) 171 lb (77.6 kg) LMP SpO2 BMI OB Status Smoking Status 04/07/2018 100% 28.02 kg/m2 Having regular periods Current Every Day Smoker Vitals History BMI and BSA Data Body Mass Index Body Surface Area 28.02 kg/m 2 1.89 m 2 Preferred Pharmacy Pharmacy Name Phone [address], [address] [place] [phone] Your Updated Medication List  
  
   
This list is accurate as of 4/16/18  1:14 PM.  Always use your most recent med list. amLODIPine 10 mg tablet Commonly known as:  Unknown [place] Take 1 Tab by mouth daily. clonazePAM 1 mg tablet Commonly known as:  Waynard Escobar Take 1 Tab by mouth two (2) times daily as needed. Max Daily Amount: 2 mg.  
  
 diclofenac 1.3 % Pt12 Commonly known as:  FLECTOR  
1 Patch by TransDERmal route every twelve (12) hours every twelve (12) hours. DULoxetine 60 mg capsule Commonly known as:  CYMBALTA Take 1 Cap by mouth daily. gabapentin 600 mg tablet Commonly known as:  NEURONTIN Take  by mouth three (3) times daily. glipiZIDE 10 mg tablet Commonly known as:  Xavier Mikey Take 1 Tab by mouth two (2) times a day. HYDROcodone-acetaminophen 7.5-325 mg per tablet Commonly known as:  Lee Marli Take  by mouth. insulin glargine 100 unit/mL (3 mL) Inpn Commonly known as:  LANTUS SOLOSTAR U-100 INSULIN  
50 Units by SubCUTAneous route nightly. insulin nph-regular human rec 100 unit/mL (70-30) Inpn Commonly known as:  HumuLIN 70/30 U-100 KwikPen 15 Units by SubCUTAneous route two (2) times a day and at bedtime. methocarbamol 500 mg tablet Commonly known as:  ROBAXIN Take 1 Tab by mouth three (3) times daily. triamcinolone acetonide 0.1 % ointment Commonly known as:  KENALOG Apply  to affected area two (2) times a day. use thin layer Prescriptions Printed Refills  
 clonazePAM (KLONOPIN) 1 mg tablet 0 Sig: Take 1 Tab by mouth two (2) times daily as needed. Max Daily Amount: 2 mg. Class: Print Route: Oral  
  
Follow-up Instructions Return if symptoms worsen or fail to improve. Patient Instructions Allergic Reaction: Care Instructions Your Care Instructions An allergic reaction is an excessive response from your immune system to a medicine, chemical, food, insect bite, or other substance. A reaction can range from mild to life-threatening. Some people have a mild rash, hives, and itching or stomach cramps. In severe reactions, swelling of your tongue and throat can close up your airway so that you cannot breathe. Follow-up care is a key part of your treatment and safety. Be sure to make and go to all appointments, and call your doctor if you are having problems. It's also a good idea to know your test results and keep a list of the medicines you take. How can you care for yourself at home? · If you know what caused your allergic reaction, be sure to avoid it. Your allergy may become more severe each time you have a reaction. · Take an over-the-counter antihistamine, such as cetirizine (Zyrtec) or loratadine (Claritin), to treat mild symptoms. Read and follow directions on the label. Some antihistamines can make you feel sleepy.  Do not give antihistamines to a child unless you have checked with your doctor first. Mild symptoms include sneezing or an itchy or runny nose; an itchy mouth; a few hives or mild itching; and mild nausea or stomach discomfort. · Do not scratch hives or a rash. Put a cold, moist towel on them or take cool baths to relieve itching. Put ice packs on hives, swelling, or insect stings for 10 to 15 minutes at a time. Put a thin cloth between the ice pack and your skin. Do not take hot baths or showers. They will make the itching worse. · Your doctor may prescribe a shot of epinephrine to carry with you in case you have a severe reaction. Learn how to give yourself the shot and keep it with you at all times. Make sure it is not . · Go to the emergency room every time you have a severe reaction, even if you have used your shot of epinephrine and are feeling better. Symptoms can come back after a shot. · Wear medical alert jewelry that lists your allergies. You can buy this at most [employer]. · If your child has a severe allergy, make sure that his or her teachers, babysitters, coaches, and other caregivers know about the allergy. They should have an epinephrine shot, know how and when to give it, and have a plan to take your child to the hospital. 
When should you call for help? Give an epinephrine shot if: 
? · You think you are having a severe allergic reaction. ? · You have symptoms in more than one body area, such as mild nausea and an itchy mouth. ? After giving an epinephrine shot call 911, even if you feel better. ?Call 911 if: 
? · You have symptoms of a severe allergic reaction. These may include: 
¨ Sudden raised, red areas (hives) all over your body. ¨ Swelling of the throat, mouth, lips, or tongue. ¨ Trouble breathing. ¨ Passing out (losing consciousness). Or you may feel very lightheaded or suddenly feel weak, confused, or restless. ? · You have been given an epinephrine shot, even if you feel better. ?Call your doctor now or seek immediate medical care if: 
? · You have symptoms of an allergic reaction, such as: ¨ A rash or hives (raised, red areas on the skin). ¨ Itching. ¨ Swelling. ¨ Belly pain, nausea, or vomiting. ? Watch closely for changes in your health, and be sure to contact your doctor if: 
? · You do not get better as expected. Where can you learn more? Go to http://cindy-vale.info/. Enter Z566 in the search box to learn more about \"Allergic Reaction: Care Instructions. \" Current as of: September 29, 2016 Content Version: 11.4 © 4661-1885 [employer]. Care instructions adapted under license by [employer] (which disclaims liability or warranty for this information). If you have questions about a medical condition or this instruction, always ask your healthcare professional. [place] [address] any warranty or liability for your use of this information. Insomnia: Care Instructions Your Care Instructions Insomnia is the inability to sleep well. It is a common problem for most people at some time. Insomnia may make it hard for you to get to sleep, stay asleep, or sleep as long as you need to. This can make you tired and grouchy during the day. It can also make you forgetful, less effective at work, and unhappy. Insomnia can be caused by conditions such as depression or anxiety. Pain can also affect your ability to sleep. When these problems are solved, the insomnia usually clears up. But sometimes bad sleep habits can cause insomnia. If insomnia is affecting your work or your enjoyment of life, you can take steps to improve your sleep. Follow-up care is a key part of your treatment and safety. Be sure to make and go to all appointments, and call your doctor if you are having problems.  It's also a good idea to know your test results and keep a list of the medicines you take. How can you care for yourself at home? What to avoid · Do not have drinks with caffeine, such as coffee or black tea, for 8 hours before bed. · Do not smoke or use other types of tobacco near bedtime. Nicotine is a stimulant and can keep you awake. · Avoid drinking alcohol late in the evening, because it can cause you to wake in the middle of the night. · Do not eat a big meal close to bedtime. If you are hungry, eat a light snack. · Do not drink a lot of water close to bedtime, because the need to urinate may wake you up during the night. · Do not read or watch TV in bed. Use the bed only for sleeping and sexual activity. What to try · Go to bed at the same time every night, and wake up at the same time every morning. Do not take naps during the day. · Keep your bedroom quiet, dark, and cool. · Sleep on a comfortable pillow and mattress. · If watching the clock makes you anxious, turn it facing away from you so you cannot see the time. · If you worry when you lie down, start a worry book. Well before bedtime, write down your worries, and then set the book and your concerns aside. · Try meditation or other relaxation techniques before you go to bed. · If you cannot fall asleep, get up and go to another room until you feel sleepy. Do something relaxing. Repeat your bedtime routine before you go to bed again. · Make your house quiet and calm about an hour before bedtime. Turn down the lights, turn off the TV, log off the computer, and turn down the volume on music. This can help you relax after a busy day. When should you call for help? Watch closely for changes in your health, and be sure to contact your doctor if: 
? · Your efforts to improve your sleep do not work. ? · Your insomnia gets worse. ? · You have been feeling down, depressed, or hopeless or have lost interest in things that you usually enjoy. Where can you learn more? Go to http://cindy-vale.info/. Enter P513 in the search box to learn more about \"Insomnia: Care Instructions. \" Current as of: July 26, 2016 Content Version: 11.4 © 7293-3019 Healthwise, [employer]. Care instructions adapted under license by [employer] (which disclaims liability or warranty for this information). If you have questions about a medical condition or this instruction, always ask your healthcare professional. [address] any warranty or liability for your use of this information. Introducing [facility]! [facility] introduces [employer] patient portal. Now you can access parts of your medical record, email your doctor's office, and request medication refills online. 1. In your internet browser, go to [email or web address] 2. Click on the First Time User? Click Here link in the Sign In box. You will see the New Member Sign Up page. 3. Enter your [employer] Access Code exactly as it appears below. You will not need to use this code after youve completed the sign-up process. If you do not sign up before the expiration date, you must request a new code. · [employer] Access Code: [email or web address] Expires: 7/5/2018 12:26 AM 
 
4. Enter the last four digits of your Social Security Number (xxxx) and Date of Birth (mm/dd/yyyy) as indicated and click Submit. You will be taken to the next sign-up page. 5. Create a [employer] ID. This will be your [employer] login ID and cannot be changed, so think of one that is secure and easy to remember. 6. Create a [employer] password. You can change your password at any time. 7. Enter your Password Reset Question and Answer. This can be used at a later time if you forget your password. 8. Enter your e-mail address. You will receive e-mail notification when new information is available in [address] 9. Click Sign Up. You can now view and download portions of your medical record. 10. Click the Download Summary menu link to download a portable copy of your medical information. If you have questions, please visit the Frequently Asked Questions section of the [employer] website. Remember, [employer] is NOT to be used for urgent needs. For medical emergencies, dial 911. Now available from your iPhone and Android! Please provide this summary of care documentation to your next provider. Your primary care clinician is listed as NONE. If you have any questions after today's visit, please call [phone].

## 2018-04-16 NOTE — PATIENT INSTRUCTIONS
Allergic Reaction: Care Instructions  Your Care Instructions    An allergic reaction is an excessive response from your immune system to a medicine, chemical, food, insect bite, or other substance. A reaction can range from mild to life-threatening. Some people have a mild rash, hives, and itching or stomach cramps. In severe reactions, swelling of your tongue and throat can close up your airway so that you cannot breathe. Follow-up care is a key part of your treatment and safety. Be sure to make and go to all appointments, and call your doctor if you are having problems. It's also a good idea to know your test results and keep a list of the medicines you take. How can you care for yourself at home? · If you know what caused your allergic reaction, be sure to avoid it. Your allergy may become more severe each time you have a reaction. · Take an over-the-counter antihistamine, such as cetirizine (Zyrtec) or loratadine (Claritin), to treat mild symptoms. Read and follow directions on the label. Some antihistamines can make you feel sleepy. Do not give antihistamines to a child unless you have checked with your doctor first. Mild symptoms include sneezing or an itchy or runny nose; an itchy mouth; a few hives or mild itching; and mild nausea or stomach discomfort. · Do not scratch hives or a rash. Put a cold, moist towel on them or take cool baths to relieve itching. Put ice packs on hives, swelling, or insect stings for 10 to 15 minutes at a time. Put a thin cloth between the ice pack and your skin. Do not take hot baths or showers. They will make the itching worse. · Your doctor may prescribe a shot of epinephrine to carry with you in case you have a severe reaction. Learn how to give yourself the shot and keep it with you at all times. Make sure it is not . · Go to the emergency room every time you have a severe reaction, even if you have used your shot of epinephrine and are feeling better. Symptoms can come back after a shot. · Wear medical alert jewelry that lists your allergies. You can buy this at most drugstores. · If your child has a severe allergy, make sure that his or her teachers, babysitters, coaches, and other caregivers know about the allergy. They should have an epinephrine shot, know how and when to give it, and have a plan to take your child to the hospital.  When should you call for help? Give an epinephrine shot if:  ? · You think you are having a severe allergic reaction. ? · You have symptoms in more than one body area, such as mild nausea and an itchy mouth. ? After giving an epinephrine shot call 911, even if you feel better. ?Call 911 if:  ? · You have symptoms of a severe allergic reaction. These may include:  ¨ Sudden raised, red areas (hives) all over your body. ¨ Swelling of the throat, mouth, lips, or tongue. ¨ Trouble breathing. ¨ Passing out (losing consciousness). Or you may feel very lightheaded or suddenly feel weak, confused, or restless. ? · You have been given an epinephrine shot, even if you feel better. ?Call your doctor now or seek immediate medical care if:  ? · You have symptoms of an allergic reaction, such as:  ¨ A rash or hives (raised, red areas on the skin). ¨ Itching. ¨ Swelling. ¨ Belly pain, nausea, or vomiting. ? Watch closely for changes in your health, and be sure to contact your doctor if:  ? · You do not get better as expected. Where can you learn more? Go to http://cindy-vale.info/. Enter M516 in the search box to learn more about \"Allergic Reaction: Care Instructions. \"  Current as of: September 29, 2016  Content Version: 11.4  © 9915-9253 [employer]. Care instructions adapted under license by [employer] (which disclaims liability or warranty for this information).  If you have questions about a medical condition or this instruction, always ask your healthcare professional. Order Mapper, [facility] disclaims any warranty or liability for your use of this information. Insomnia: Care Instructions  Your Care Instructions    Insomnia is the inability to sleep well. It is a common problem for most people at some time. Insomnia may make it hard for you to get to sleep, stay asleep, or sleep as long as you need to. This can make you tired and grouchy during the day. It can also make you forgetful, less effective at work, and unhappy. Insomnia can be caused by conditions such as depression or anxiety. Pain can also affect your ability to sleep. When these problems are solved, the insomnia usually clears up. But sometimes bad sleep habits can cause insomnia. If insomnia is affecting your work or your enjoyment of life, you can take steps to improve your sleep. Follow-up care is a key part of your treatment and safety. Be sure to make and go to all appointments, and call your doctor if you are having problems. It's also a good idea to know your test results and keep a list of the medicines you take. How can you care for yourself at home? What to avoid  · Do not have drinks with caffeine, such as coffee or black tea, for 8 hours before bed. · Do not smoke or use other types of tobacco near bedtime. Nicotine is a stimulant and can keep you awake. · Avoid drinking alcohol late in the evening, because it can cause you to wake in the middle of the night. · Do not eat a big meal close to bedtime. If you are hungry, eat a light snack. · Do not drink a lot of water close to bedtime, because the need to urinate may wake you up during the night. · Do not read or watch TV in bed. Use the bed only for sleeping and sexual activity. What to try  · Go to bed at the same time every night, and wake up at the same time every morning. Do not take naps during the day. · Keep your bedroom quiet, dark, and cool. · Sleep on a comfortable pillow and mattress.   · If watching the clock makes you anxious, turn it facing away from you so you cannot see the time. · If you worry when you lie down, start a worry book. Well before bedtime, write down your worries, and then set the book and your concerns aside. · Try meditation or other relaxation techniques before you go to bed. · If you cannot fall asleep, get up and go to another room until you feel sleepy. Do something relaxing. Repeat your bedtime routine before you go to bed again. · Make your house quiet and calm about an hour before bedtime. Turn down the lights, turn off the TV, log off the computer, and turn down the volume on music. This can help you relax after a busy day. When should you call for help? Watch closely for changes in your health, and be sure to contact your doctor if:  ? · Your efforts to improve your sleep do not work. ? · Your insomnia gets worse. ? · You have been feeling down, depressed, or hopeless or have lost interest in things that you usually enjoy. Where can you learn more? Go to http://cindy-vale.info/. Enter P513 in the search box to learn more about \"Insomnia: Care Instructions. \"  Current as of: July 26, 2016  Content Version: 11.4  © 3445-4516 Healthwise, [employer]. Care instructions adapted under license by [employer] (which disclaims liability or warranty for this information). If you have questions about a medical condition or this instruction, always ask your healthcare professional. [place] [address] any warranty or liability for your use of this information.

## 2018-04-16 NOTE — PROGRESS NOTES
HISTORY OF PRESENT ILLNESS  Brian Jara is a 41 y.o. female. HPI Comments: Acute care with c/o allergic reaction to Trazodone. Reports after taking this medication, she had stool incontinence at night. Reports this had happened in the past when she took the medication but she forgot to mention this prior to the medication being prescribed. Hip pain: reports improvement of pain with Robaxin although she has been taking 1 and half tab three times a day. HTN: BP remains elevated. Reports cold intolerance with amlodipine after taking it twice hence she has stopped taking the medication. Medication Reaction   The history is provided by the patient. This is a new problem. Medication Evaluation   The history is provided by the patient. This is a new problem. Review of Systems   Constitutional: Negative. HENT: Negative. Respiratory: Negative. Cardiovascular: Negative. Gastrointestinal: Positive for diarrhea. Genitourinary: Negative. Musculoskeletal: Negative. Neurological: Positive for sensory change. Psychiatric/Behavioral: The patient is nervous/anxious and has insomnia. Past Medical History:   Diagnosis Date    Anxiety     Diabetes ([facility] [address]     Miscarriage     x 2    Neuropathy     Stillborn, abnormal      No past surgical history on file. Current Outpatient Prescriptions on File Prior to Visit   Medication Sig Dispense Refill    diclofenac (FLECTOR) 1.3 % pt12 1 Patch by TransDERmal route every twelve (12) hours every twelve (12) hours.  insulin nph-regular human rec (HUMULIN 70/30 U-100 KWIKPEN) 100 unit/mL (70-30) inpn 15 Units by SubCUTAneous route two (2) times a day and at bedtime. 5 Adjustable Dose Pre-filled Pen Syringe 11    insulin glargine (LANTUS SOLOSTAR U-100 INSULIN) 100 unit/mL (3 mL) inpn 50 Units by SubCUTAneous route nightly. 5 Adjustable Dose Pre-filled Pen Syringe 11    DULoxetine (CYMBALTA) 60 mg capsule Take 1 Cap by mouth daily.  30 Cap 3    glipiZIDE (GLUCOTROL) 10 mg tablet Take 1 Tab by mouth two (2) times a day. 60 Tab 3    triamcinolone acetonide (KENALOG) 0.1 % ointment Apply  to affected area two (2) times a day. use thin layer 30 g 0    amLODIPine (NORVASC) 10 mg tablet Take 1 Tab by mouth daily. 30 Tab 3    methocarbamol (ROBAXIN) 500 mg tablet Take 1 Tab by mouth three (3) times daily. 90 Tab 3     No current facility-administered medications on file prior to visit. Allergies and Intolerances: Allergies   Allergen Reactions    Lyrica [Pregabalin] Anaphylaxis     suicidal thoughts    Ibuprofen Swelling     legs    Trazodone Other (comments)     Loose bowel movents       Family History:   Family History   Problem Relation Age of Onset    Diabetes Mother     Heart Attack Mother     Cancer Father     Diabetes Sister     Diabetes Maternal Grandmother     Diabetes Paternal Grandmother     Arthritis-rheumatoid Paternal Grandmother        Social History:   She  reports that she has been smoking. She has never used smokeless tobacco. She  reports that she drinks alcohol. Vitals:   Visit Vitals    BP (!) 158/96    Pulse (!) 112    Temp 98.1 °F (36.7 °C)    Resp 14    Ht 5' 5.5\" (1.664 m)    Wt 171 lb (77.6 kg)    LMP 04/07/2018    SpO2 100%    BMI 28.02 kg/m2     Body surface area is 1.89 meters squared. Physical Exam   Constitutional: She is oriented to person, place, and time. She appears well-developed and well-nourished. HENT:   Head: Atraumatic. Cardiovascular: Normal rate. Pulmonary/Chest: Effort normal.   Neurological: She is alert and oriented to person, place, and time. Psychiatric: She has a normal mood and affect. Her behavior is normal.   Nursing note and vitals reviewed. ASSESSMENT and PLAN    ICD-10-CM ICD-9-CM    1. HTN, goal below 130/80 I10 401.9    2. Insomnia, unspecified type G47.00 780.52 clonazePAM (KLONOPIN) 1 mg tablet   3. Allergic reaction, initial encounter T78.40XA 995.3    4. Anxiety F41.9 300.00 clonazePAM (KLONOPIN) 1 mg tablet     Follow-up Disposition:  Return if symptoms worsen or fail to improve. reviewed medications and side effects in detail  Stop Trazodone. Take 1/2 tab of amlodipine daily, will change medication if cold intolerance continues. Ok to take 750 mg Rcbaxin three to four times a day    - Alarm signals discussed. ER precautions  - Plan of care reviewed with patient. Understanding verbalized and they are in agreement with plan of care.

## 2018-05-09 ENCOUNTER — OFFICE VISIT (OUTPATIENT)
Dept: FAMILY MEDICINE CLINIC | Facility: CLINIC | Age: 48
End: 2018-05-09

## 2018-05-09 VITALS
DIASTOLIC BLOOD PRESSURE: 92 MMHG | OXYGEN SATURATION: 99 % | WEIGHT: 166 LBS | RESPIRATION RATE: 14 BRPM | HEART RATE: 89 BPM | BODY MASS INDEX: 26.68 KG/M2 | TEMPERATURE: 98 F | SYSTOLIC BLOOD PRESSURE: 139 MMHG | HEIGHT: 66 IN

## 2018-05-09 DIAGNOSIS — F41.9 ANXIETY: ICD-10-CM

## 2018-05-09 DIAGNOSIS — I10 HTN, GOAL BELOW 130/80: ICD-10-CM

## 2018-05-09 DIAGNOSIS — M25.552 HIP PAIN, LEFT: ICD-10-CM

## 2018-05-09 DIAGNOSIS — G47.00 INSOMNIA, UNSPECIFIED TYPE: ICD-10-CM

## 2018-05-09 LAB — HBA1C MFR BLD HPLC: 13.1 %

## 2018-05-09 RX ORDER — CLONAZEPAM 1 MG/1
1 TABLET ORAL
Qty: 60 TAB | Refills: 0 | Status: SHIPPED | OUTPATIENT
Start: 2018-05-09 | End: 2018-06-13

## 2018-05-09 RX ORDER — METHOCARBAMOL 750 MG/1
750 TABLET, FILM COATED ORAL 3 TIMES DAILY
Qty: 90 TAB | Refills: 3 | Status: SHIPPED | OUTPATIENT
Start: 2018-05-09 | End: 2018-06-13 | Stop reason: ALTCHOICE

## 2018-05-09 NOTE — MR AVS SNAPSHOT
[address] [place] [id number] 
[phone] Patient: Leonel Newton MRN: [id number] [facility]:4/40/[birth year]285 Visit Information Date & Time Provider Department Dept. Phone Encounter #  
 5/9/2018  2:15 PM Lilian Crawford NP [employer] [phone] Follow-up Instructions Return in about 6 weeks (around 6/20/2018), or if symptoms worsen or fail to improve, for DM, HTN. Upcoming Health Maintenance Date Due  
 LIPID PANEL Q1 1972 EYE EXAM RETINAL OR DILATED Q1 8/20/1982 Pneumococcal 19-64 Medium Risk (1 of 1 - PPSV23) 8/20/1991 DTaP/Tdap/Td series (1 - Tdap) 8/20/1993 PAP AKA CERVICAL CYTOLOGY 8/20/1993 HEMOGLOBIN A1C Q6M 6/22/2018* Influenza Age 5 to Adult 8/1/2018 FOOT EXAM Q1 4/12/2019 MICROALBUMIN Q1 4/12/2019 *Topic was postponed. The date shown is not the original due date. Allergies as of 5/9/2018  Review Complete On: 5/9/2018 By: Rika Berrios Severity Noted Reaction Type Reactions Lyrica [Pregabalin] High 04/12/2018    Anaphylaxis  
 suicidal thoughts Ibuprofen  04/12/2018    Swelling  
 legs Trazodone  04/16/2018    Other (comments) Loose bowel movents Current Immunizations  Never Reviewed No immunizations on file. Not reviewed this visit You Were Diagnosed With   
  
 Codes Comments DM type 2, uncontrolled, with neuropathy ([facility] [address]    -  Primary ICD-10-CM: E11.40, E11.65 ICD-9-CM: 250.62, 357.2 HTN, goal below 130/80     ICD-10-CM: I10 
ICD-9-CM: 401.9 Hip pain, left     ICD-10-CM: M25.552 ICD-9-CM: 719.45 Anxiety     ICD-10-CM: F41.9 ICD-9-CM: 300.00 Insomnia, unspecified type     ICD-10-CM: G47.00 ICD-9-CM: 780.52 Vitals BP Pulse Temp Resp Height(growth percentile) Weight(growth percentile) (!) 139/92 89 98 °F (36.7 °C) 14 5' 5.5\" (1.664 m) 166 lb (75.3 kg) LMP SpO2 BMI OB Status Smoking Status 05/03/2018 99% 27.2 kg/m2 Having regular periods Current Every Day Smoker Vitals History BMI and BSA Data Body Mass Index Body Surface Area  
 27.2 kg/m 2 1.87 m 2 Preferred Pharmacy Pharmacy Name Phone [address] [place] [address], [address] [place] [phone] Your Updated Medication List  
  
   
This list is accurate as of 5/9/18  2:23 PM.  Always use your most recent med list. amLODIPine 10 mg tablet Commonly known as:  Lilliana Matar Take 1 Tab by mouth daily. clonazePAM 1 mg tablet Commonly known as:  Evlyn Devon Take 1 Tab by mouth two (2) times daily as needed. Max Daily Amount: 2 mg.  
  
 diclofenac 1.3 % Pt12 Commonly known as:  FLECTOR  
1 Patch by TransDERmal route every twelve (12) hours every twelve (12) hours. DULoxetine 60 mg capsule Commonly known as:  CYMBALTA Take 1 Cap by mouth daily. glipiZIDE 10 mg tablet Commonly known as:  Ashley Sharita Take 1 Tab by mouth two (2) times a day. insulin glargine 100 unit/mL (3 mL) Inpn Commonly known as:  LANTUS SOLOSTAR U-100 INSULIN  
50 Units by SubCUTAneous route nightly. insulin nph-regular human rec 100 unit/mL (70-30) Inpn Commonly known as:  HumuLIN 70/30 U-100 KwikPen 15 Units by SubCUTAneous route two (2) times a day and at bedtime. methocarbamol 750 mg tablet Commonly known as:  ROBAXIN Take 1 Tab by mouth three (3) times daily. triamcinolone acetonide 0.1 % ointment Commonly known as:  KENALOG Apply  to affected area two (2) times a day. use thin layer Prescriptions Printed Refills  
 clonazePAM (KLONOPIN) 1 mg tablet 0 Sig: Take 1 Tab by mouth two (2) times daily as needed. Max Daily Amount: 2 mg. Class: Print Route: Oral  
  
Prescriptions Sent to Pharmacy  Refills  
 methocarbamol (ROBAXIN) 750 mg tablet 3  
 Sig: Take 1 Tab by mouth three (3) times daily. Class: Normal  
 [facility] [address] [facility] #: [phone] Route: Oral  
  
We Performed the Following AMB POC HEMOGLOBIN A1C [97953 CPT(R)] Follow-up Instructions Return in about 6 weeks (around 6/20/2018), or if symptoms worsen or fail to improve, for DM, HTN. Patient Instructions Diabetes Blood Sugar Emergencies: Your Action Plan How can you prevent a blood sugar emergency? An important part of living with diabetes is keeping your blood sugar in your target range. You'll need to know what to do if it's too high or too low. Managing your blood sugar levels helps you avoid emergencies. This care sheet will teach you about the signs of high and low blood sugar. It will help you make an action plan with your doctor for when these signs occur. Low blood sugar is more likely to happen if you take certain medicines for diabetes. It can also happen if you skip a meal, drink alcohol, or exercise more than usual. 
You may get high blood sugar if you eat differently than you normally do. One example is eating more carbohydrate than usual. Having a cold, the flu, or other sudden illness can also cause high blood sugar levels. Levels can also rise if you miss a dose of medicine. Any change in how you take your medicine may affect your blood sugar level. So it's important to work with your doctor before you make any changes. Check your blood sugar Work with your doctor to fill in the blank spaces below that apply to you. Track your levels, know your target range, and write down ways you can get your blood sugar back in your target range. A log book can help you track your levels. Take the book to all of your medical appointments. · Check your blood sugar _____ times a day, at these times:________________________________________________.   (For example: Before meals, at bedtime, before exercise, during exercise, other.) · Your blood sugar target range before a meal is ___________________. Your blood sugar target range after a meal is _______________________. · Do bgkf-___________________________________________________-sc get your blood sugar back within your safe range if your blood sugar results are _________________________________________. (For example: Less than 70 or above 250 mg/dL.) Call your doctor when your blood sugar results are ___________________________________. (For example: Less than 70 or above 250 mg/dL.) What are the symptoms of low and high blood sugar? Common symptoms of low blood sugar are sweating and feeling shaky, weak, hungry, or confused. Symptoms can start quickly. Common symptoms of high blood sugar are feeling very thirsty or very hungry. You may also pass urine more often than usual. You may have blurry vision and may lose weight without trying. But some people may have high or low blood sugar without having any symptoms. That's a good reason to check your blood sugar on a regular schedule. What should you do if you have symptoms? Work with your doctor to fill in the blank spaces below that apply to you. Low blood sugar If you have symptoms of low blood sugar, check your blood sugar. If it's below _____ ( for example, below 70), eat or drink a quick-sugar food that has about 15 grams of carbohydrate. Your goal is to get your level back to your safe range. Check your blood sugar again 15 minutes later. If it's still not in your target range, take another 15 grams of carbohydrate and check your blood sugar again in 15 minutes. Repeat this until you reach your target. Then go back to your regular testing schedule. When you have low blood sugar, it's best to stop or reduce any physical activity until your blood sugar is back in your target range and is stable. If you must stay active, eat or drink 30 grams of carbohydrate. Then check your blood sugar again in 15 minutes. If it's not in your target range, take another 30 grams of carbohydrates. Check your blood sugar again in 15 minutes. Keep doing this until you reach your target. You can then go back to your regular testing schedule. If your symptoms or blood sugar levels are getting worse or have not improved after 15 minutes, seek medical care right away. Here are some examples of quick-sugar foods with 15 grams of carbohydrate: · 3 or 4 glucose tablets · 1 tube of glucose gel · Hard candy (such as 3 Jolly Ranchers or 5 to [employer]) · ½ cup to ¾ cup (4 to 6 ounces) of fruit juice or regular (not diet) soda High blood sugar If you have symptoms of high blood sugar, check your blood sugar. Your goal is to get your level back to your target range. If it's above ______ ( for example, above 250), follow these steps: · If you missed a dose of your diabetes medicine, take it now. Take only the amount of medicine that you have been prescribed. Do not take more or less medicine. · Give yourself insulin if your doctor has prescribed it for high blood sugar. · Test for ketones, if the doctor told you to do so. If the results of the ketone test show a moderate-to-large amount of ketones, call the doctor for advice. · Wait 30 minutes after you take the extra insulin or the missed medicine. Check your blood sugar again. If your symptoms or blood sugar levels are getting worse or have not improved after taking these steps, seek medical care right away. Follow-up care is a key part of your treatment and safety. Be sure to make and go to all appointments, and call your doctor if you are having problems. It's also a good idea to know your test results and keep a list of the medicines you take. Where can you learn more? Go to http://cindy-vale.info/. Enter M870 in the search box to learn more about \"Diabetes Blood Sugar Emergencies: Your Action Plan. \" 
 Current as of: March 13, 2017 Content Version: 11.4 © 7783-4352 Healthwise, [employer]. Care instructions adapted under license by [employer] (which disclaims liability or warranty for this information). If you have questions about a medical condition or this instruction, always ask your healthcare professional. [address] any warranty or liability for your use of this information. Introducing [facility]! Joy Steven introduces KeyVive patient portal. Now you can access parts of your medical record, email your doctor's office, and request medication refills online. 1. In your internet browser, go to [email or web address] 2. Click on the First Time User? Click Here link in the Sign In box. You will see the New Member Sign Up page. 3. Enter your KeyVive Access Code exactly as it appears below. You will not need to use this code after youve completed the sign-up process. If you do not sign up before the expiration date, you must request a new code. · KeyVive Access Code: [email or web address] Expires: 7/5/2018 12:26 AM 
 
4. Enter the last four digits of your Social Security Number (xxxx) and Date of Birth (mm/dd/yyyy) as indicated and click Submit. You will be taken to the next sign-up page. 5. Create a KeyVive ID. This will be your KeyVive login ID and cannot be changed, so think of one that is secure and easy to remember. 6. Create a KeyVive password. You can change your password at any time. 7. Enter your Password Reset Question and Answer. This can be used at a later time if you forget your password. 8. Enter your e-mail address. You will receive e-mail notification when new information is available in [address] 9. Click Sign Up. You can now view and download portions of your medical record. 10. Click the Download Summary menu link to download a portable copy of your medical information. If you have questions, please visit the Frequently Asked Questions section of the [employer] website. Remember, Firm[employer] is NOT to be used for urgent needs. For medical emergencies, dial 911. Now available from your iPhone and Android! Please provide this summary of care documentation to your next provider. Your primary care clinician is listed as Felicia Valentin. If you have any questions after today's visit, please call [phone].

## 2018-05-09 NOTE — PROGRESS NOTES
1. Have you been to the ER, urgent care clinic since your last visit? Hospitalized since your last visit? No    2. Have you seen or consulted any other health care providers outside of the [address] [place] [address] since your last visit? Include any pap smears or colon screening.  No

## 2018-05-09 NOTE — PATIENT INSTRUCTIONS
Diabetes Blood Sugar Emergencies: Your Action Plan  How can you prevent a blood sugar emergency? An important part of living with diabetes is keeping your blood sugar in your target range. You'll need to know what to do if it's too high or too low. Managing your blood sugar levels helps you avoid emergencies. This care sheet will teach you about the signs of high and low blood sugar. It will help you make an action plan with your doctor for when these signs occur. Low blood sugar is more likely to happen if you take certain medicines for diabetes. It can also happen if you skip a meal, drink alcohol, or exercise more than usual.  You may get high blood sugar if you eat differently than you normally do. One example is eating more carbohydrate than usual. Having a cold, the flu, or other sudden illness can also cause high blood sugar levels. Levels can also rise if you miss a dose of medicine. Any change in how you take your medicine may affect your blood sugar level. So it's important to work with your doctor before you make any changes. Check your blood sugar  Work with your doctor to fill in the blank spaces below that apply to you. Track your levels, know your target range, and write down ways you can get your blood sugar back in your target range. A log book can help you track your levels. Take the book to all of your medical appointments. · Check your blood sugar _____ times a day, at these times:________________________________________________. (For example: Before meals, at bedtime, before exercise, during exercise, other.)  · Your blood sugar target range before a meal is ___________________. Your blood sugar target range after a meal is _______________________. · Do jjnp-___________________________________________________-rr get your blood sugar back within your safe range if your blood sugar results are _________________________________________.  (For example: Less than 70 or above 250 mg/dL.)  Call your doctor when your blood sugar results are ___________________________________. (For example: Less than 70 or above 250 mg/dL.)  What are the symptoms of low and high blood sugar? Common symptoms of low blood sugar are sweating and feeling shaky, weak, hungry, or confused. Symptoms can start quickly. Common symptoms of high blood sugar are feeling very thirsty or very hungry. You may also pass urine more often than usual. You may have blurry vision and may lose weight without trying. But some people may have high or low blood sugar without having any symptoms. That's a good reason to check your blood sugar on a regular schedule. What should you do if you have symptoms? Work with your doctor to fill in the blank spaces below that apply to you. Low blood sugar  If you have symptoms of low blood sugar, check your blood sugar. If it's below _____ ( for example, below 70), eat or drink a quick-sugar food that has about 15 grams of carbohydrate. Your goal is to get your level back to your safe range. Check your blood sugar again 15 minutes later. If it's still not in your target range, take another 15 grams of carbohydrate and check your blood sugar again in 15 minutes. Repeat this until you reach your target. Then go back to your regular testing schedule. When you have low blood sugar, it's best to stop or reduce any physical activity until your blood sugar is back in your target range and is stable. If you must stay active, eat or drink 30 grams of carbohydrate. Then check your blood sugar again in 15 minutes. If it's not in your target range, take another 30 grams of carbohydrates. Check your blood sugar again in 15 minutes. Keep doing this until you reach your target. You can then go back to your regular testing schedule. If your symptoms or blood sugar levels are getting worse or have not improved after 15 minutes, seek medical care right away.   Here are some examples of quick-sugar foods with 15 grams of carbohydrate:  · 3 or 4 glucose tablets  · 1 tube of glucose gel  · Hard candy (such as 3 Jolly Ranchers or 5 to 7 Life Savers)  · ½ cup to ¾ cup (4 to 6 ounces) of fruit juice or regular (not diet) soda  High blood sugar  If you have symptoms of high blood sugar, check your blood sugar. Your goal is to get your level back to your target range. If it's above ______ ( for example, above 250), follow these steps:  · If you missed a dose of your diabetes medicine, take it now. Take only the amount of medicine that you have been prescribed. Do not take more or less medicine. · Give yourself insulin if your doctor has prescribed it for high blood sugar. · Test for ketones, if the doctor told you to do so. If the results of the ketone test show a moderate-to-large amount of ketones, call the doctor for advice. · Wait 30 minutes after you take the extra insulin or the missed medicine. Check your blood sugar again. If your symptoms or blood sugar levels are getting worse or have not improved after taking these steps, seek medical care right away. Follow-up care is a key part of your treatment and safety. Be sure to make and go to all appointments, and call your doctor if you are having problems. It's also a good idea to know your test results and keep a list of the medicines you take. Where can you learn more? Go to http://cindy-vale.info/. Enter V192 in the search box to learn more about \"Diabetes Blood Sugar Emergencies: Your Action Plan. \"  Current as of: March 13, 2017  Content Version: 11.4  © 1109-3920 [employer]. Care instructions adapted under license by [employer] (which disclaims liability or warranty for this information). If you have questions about a medical condition or this instruction, always ask your healthcare professional. [address] any warranty or liability for your use of this information.

## 2018-05-09 NOTE — PROGRESS NOTES
HISTORY OF PRESENT ILLNESS  Missy Meneses is a 41 y.o. female. HPI Comments: HTN: BP improving. Reports med compliance. BP is not measured at home. Denies any leg swelling or palpitations. Attempting to follow a low sodium diet. DM: remains uncontrolled. She has been taking Humulin twice a day instead of three times a day. Also on Lantus 45 units instead of 50 units. BG is monitored at home and she reports elevated readings. Hip pain: has not seen ortho yet. Reports her appt was cancelled and she has not rescheduled it. Robaxin helps her pain, she has been taking 1 and half tab TID. Insomnia: klonopin is not helping much. Usually helps when she takes \"the yellow klonopin\" which she gets from [facility] pharmacy. The tabs she has are from a different pharmacy and does not help much. She gets about 4 hrs of sleep. Requesting a new script to take to a different pharmacy. Hypertension    The history is provided by the patient. This is a chronic problem. The problem has been gradually improving. Risk factors include family history and dyslipidemia. Diabetes   The history is provided by the patient. This is a chronic problem. The current episode started more than 1 week ago. The problem has been gradually improving. Medication Evaluation   The history is provided by the patient. This is a new problem. Review of Systems   Constitutional: Negative. HENT: Negative. Respiratory: Negative. Cardiovascular: Negative. Gastrointestinal: Negative. Genitourinary: Negative. Musculoskeletal: Positive for joint pain. Psychiatric/Behavioral: The patient is nervous/anxious and has insomnia. Past Medical History:   Diagnosis Date    Anxiety     Diabetes ([facility] [address]     Miscarriage     x 2    Neuropathy     Stillborn, abnormal      No past surgical history on file.   Current Outpatient Prescriptions on File Prior to Visit   Medication Sig Dispense Refill    diclofenac (FLECTOR) 1.3 % pt12 1 Patch by TransDERmal route every twelve (12) hours every twelve (12) hours.  insulin nph-regular human rec (HUMULIN 70/30 U-100 KWIKPEN) 100 unit/mL (70-30) inpn 15 Units by SubCUTAneous route two (2) times a day and at bedtime. (Patient taking differently: 15 Units by SubCUTAneous route two (2) times a day.) 5 Adjustable Dose Pre-filled Pen Syringe 11    insulin glargine (LANTUS SOLOSTAR U-100 INSULIN) 100 unit/mL (3 mL) inpn 50 Units by SubCUTAneous route nightly. 5 Adjustable Dose Pre-filled Pen Syringe 11    DULoxetine (CYMBALTA) 60 mg capsule Take 1 Cap by mouth daily. 30 Cap 3    glipiZIDE (GLUCOTROL) 10 mg tablet Take 1 Tab by mouth two (2) times a day. 60 Tab 3    triamcinolone acetonide (KENALOG) 0.1 % ointment Apply  to affected area two (2) times a day. use thin layer 30 g 0    amLODIPine (NORVASC) 10 mg tablet Take 1 Tab by mouth daily. 30 Tab 3     No current facility-administered medications on file prior to visit. Allergies and Intolerances: Allergies   Allergen Reactions    Lyrica [Pregabalin] Anaphylaxis     suicidal thoughts    Ibuprofen Swelling     legs    Trazodone Other (comments)     Loose bowel movents       Family History:   Family History   Problem Relation Age of Onset    Diabetes Mother     Heart Attack Mother     Cancer Father     Diabetes Sister     Diabetes Maternal Grandmother     Diabetes Paternal Grandmother     Arthritis-rheumatoid Paternal Grandmother        Social History:   She  reports that she has been smoking. She has never used smokeless tobacco. She  reports that she drinks alcohol. Vitals:   Visit Vitals    BP (!) 139/92    Pulse 89    Temp 98 °F (36.7 °C)    Resp 14    Ht 5' 5.5\" (1.664 m)    Wt 166 lb (75.3 kg)    LMP 05/03/2018    SpO2 99%    BMI 27.2 kg/m2     Body surface area is 1.87 meters squared.   Recent Results (from the past 12 hour(s))   AMB POC HEMOGLOBIN A1C    Collection Time: 05/09/18  2:10 PM   Result Value Ref Range Hemoglobin A1c (POC) 13.1 %       Physical Exam   Constitutional: She is oriented to person, place, and time. She appears well-developed and well-nourished. HENT:   Head: Atraumatic. Cardiovascular: Normal rate. Pulmonary/Chest: Effort normal.   Neurological: She is alert and oriented to person, place, and time. Psychiatric: She has a normal mood and affect. Her behavior is normal.   Nursing note and vitals reviewed. ASSESSMENT and PLAN    ICD-10-CM ICD-9-CM    1. DM type 2, uncontrolled, with neuropathy ([facility] [address] E11.40 250.62 AMB POC HEMOGLOBIN A1C    E11.65 357.2    2. HTN, goal below 130/80 I10 401.9    3. Hip pain, left M25.552 719.45 methocarbamol (ROBAXIN) 750 mg tablet   4. Anxiety F41.9 300.00 clonazePAM (KLONOPIN) 1 mg tablet   5. Insomnia, unspecified type G47.00 780.52 clonazePAM (KLONOPIN) 1 mg tablet     Follow-up Disposition:  Return in about 6 weeks (around 6/20/2018), or if symptoms worsen or fail to improve, for DM, HTN.  lab results and schedule of future lab studies reviewed with patient  cardiovascular risk and specific lipid/LDL goals reviewed  reviewed medications and side effects in detail  specific diabetic recommendations: diabetic diet discussed in detail, written exchange diet given, home glucose monitoring emphasized, home glucose monitoring demonstrated and taught, use and side effects of insulin is taught and glycohemoglobin and other lab monitoring discussed     - Alarm signals discussed. ER precautions  - Plan of care reviewed with patient. Understanding verbalized and they are in agreement with plan of care.

## 2018-06-13 ENCOUNTER — OFFICE VISIT (OUTPATIENT)
Dept: FAMILY MEDICINE CLINIC | Facility: CLINIC | Age: 48
End: 2018-06-13

## 2018-06-13 VITALS
HEIGHT: 69 IN | HEART RATE: 114 BPM | BODY MASS INDEX: 22.96 KG/M2 | WEIGHT: 155 LBS | TEMPERATURE: 98.9 F | RESPIRATION RATE: 18 BRPM | SYSTOLIC BLOOD PRESSURE: 129 MMHG | OXYGEN SATURATION: 99 % | DIASTOLIC BLOOD PRESSURE: 86 MMHG

## 2018-06-13 DIAGNOSIS — F51.01 PRIMARY INSOMNIA: ICD-10-CM

## 2018-06-13 DIAGNOSIS — M25.552 LEFT HIP PAIN: ICD-10-CM

## 2018-06-13 DIAGNOSIS — Z79.899 LONG TERM CURRENT USE OF THERAPEUTIC DRUG: ICD-10-CM

## 2018-06-13 DIAGNOSIS — I10 HTN, GOAL BELOW 130/80: Primary | ICD-10-CM

## 2018-06-13 DIAGNOSIS — R80.9 MICROALBUMINURIA: ICD-10-CM

## 2018-06-13 DIAGNOSIS — E11.21 TYPE II DIABETES MELLITUS WITH NEPHROPATHY (HCC): ICD-10-CM

## 2018-06-13 RX ORDER — TRAMADOL HYDROCHLORIDE 50 MG/1
50 TABLET ORAL
Qty: 21 TAB | Refills: 1 | Status: SHIPPED | OUTPATIENT
Start: 2018-06-13

## 2018-06-13 RX ORDER — ZOLPIDEM TARTRATE 10 MG/1
10 TABLET ORAL
Qty: 30 TAB | Refills: 2 | Status: SHIPPED | OUTPATIENT
Start: 2018-06-13 | End: 2018-07-11 | Stop reason: ALTCHOICE

## 2018-06-13 RX ORDER — NALOXONE HYDROCHLORIDE 4 MG/.1ML
SPRAY NASAL
Qty: 1 EACH | Refills: 11 | Status: SHIPPED | OUTPATIENT
Start: 2018-06-13

## 2018-06-13 RX ORDER — GLIPIZIDE 10 MG/1
10 TABLET ORAL DAILY
Qty: 60 TAB | Refills: 3
Start: 2018-06-13

## 2018-06-13 NOTE — PROGRESS NOTES
Chief Complaint   Patient presents with    Hypertension     follow up    Diabetes     follow up       Pt preferred language for health care discussion is english. Is someone accompanying this pt? no    Is the patient using any DME equipment during OV? no    Depression Screening:  PHQ over the last two weeks 6/13/2018   PHQ Not Done -   Little interest or pleasure in doing things Several days   Feeling down, depressed or hopeless Several days   Total Score PHQ 2 2       Learning Assessment:  Learning Assessment 6/13/2018   PRIMARY LEARNER Patient   PRIMARY LANGUAGE ENGLISH   LEARNER PREFERENCE PRIMARY DEMONSTRATION   ANSWERED BY patient   RELATIONSHIP SELF       Health Maintenance reviewed and discussed per provider. Yes    Pt is due for   Health Maintenance Due   Topic Date Due    LIPID PANEL Q1  1972    EYE EXAM RETINAL OR DILATED Q1  08/20/1982    Pneumococcal 19-64 Medium Risk (1 of 1 - PPSV23) 08/20/1991    DTaP/Tdap/Td series (1 - Tdap) 08/20/1993    PAP AKA CERVICAL CYTOLOGY  08/20/1993   . Please order/place referral if appropriate. Coordination of Care:  1. Have you been to the ER, urgent care clinic since your last visit? Hospitalized since your last visit? Yes, [facility]    2. Have you seen or consulted any other health care providers outside of the [address] [place] [address] since your last visit? Include any pap smears or colon screening.  no

## 2018-06-13 NOTE — PROGRESS NOTES
HISTORY OF PRESENT ILLNESS  Nba Renae is a 41 y.o. female. HPI Comments: Seen in follow-up after her ER visit to [place] on 5/22/18 for dizziness. This was felt to be due to overmedication with Norvasc and Robaxin. She hasn't taken either of these since her ER visit - was given some Tramadol for her left hip pain to replace Robaxin. Her fasting glucose is generally 110-140. She is taking insulin at bedtime, and before breakfast and lunch. She gets some hypoglycemia in the evening at times. Glipizide was just added to her program due to A1C of 13. She is currently uninsured, and is trying to get  on the basis of being a dependent of her son and daughter. She was referred to Ortho and Nephrology in April, but prefers to wait for the insurance to become effective. She requests an alternate medication for sleep because of side effects of Trazodone. She had been taking Klonopin for sleep and anxiety, but it isn't working any more. She also requests something for pain. Hypertension    Associated symptoms include palpitations (rapid at times). Pertinent negatives include no chest pain, no headaches, no shortness of breath, no nausea and no vomiting. Diabetes   Pertinent negatives include no chest pain, no headaches and no shortness of breath. Past Medical History:   Diagnosis Date    Anxiety     Diabetes ([address]     Miscarriage     x 2    Neuropathy     Stillborn, abnormal        History reviewed. No pertinent surgical history. History   Smoking Status    Current Every Day Smoker   Smokeless Tobacco    Never Used     Current Outpatient Prescriptions   Medication Sig    methocarbamol (ROBAXIN) 750 mg tablet Take 1 Tab by mouth three (3) times daily.  clonazePAM (KLONOPIN) 1 mg tablet Take 1 Tab by mouth two (2) times daily as needed. Max Daily Amount: 2 mg.  diclofenac (FLECTOR) 1.3 % pt12 1 Patch by TransDERmal route every twelve (12) hours every twelve (12) hours.     insulin nph-regular human rec (HUMULIN 70/30 U-100 KWIKPEN) 100 unit/mL (70-30) inpn 15 Units by SubCUTAneous route two (2) times a day and at bedtime. (Patient taking differently: 15 Units by SubCUTAneous route two (2) times a day.)    insulin glargine (LANTUS SOLOSTAR U-100 INSULIN) 100 unit/mL (3 mL) inpn 50 Units by SubCUTAneous route nightly.  DULoxetine (CYMBALTA) 60 mg capsule Take 1 Cap by mouth daily.  glipiZIDE (GLUCOTROL) 10 mg tablet Take 1 Tab by mouth two (2) times a day.  triamcinolone acetonide (KENALOG) 0.1 % ointment Apply  to affected area two (2) times a day. use thin layer    amLODIPine (NORVASC) 10 mg tablet Take 1 Tab by mouth daily. No current facility-administered medications for this visit. Review of Systems   Constitutional: Negative for chills and fever. Respiratory: Negative for shortness of breath. Cardiovascular: Positive for palpitations (rapid at times). Negative for chest pain and leg swelling. Gastrointestinal: Negative for nausea and vomiting. Neurological: Positive for tingling (in feet). Negative for focal weakness and headaches. Psychiatric/Behavioral: The patient does not have insomnia. Visit Vitals    /86 (BP 1 Location: Right arm, BP Patient Position: Sitting)    Pulse (!) 114    Temp 98.9 °F (37.2 °C) (Oral)    Resp 18    Ht 5' 9\" (1.753 m)    Wt 155 lb (70.3 kg)    SpO2 99%    BMI 22.89 kg/m2       Physical Exam   Constitutional: She is oriented to person, place, and time. She appears well-developed and well-nourished. No distress. Neck: Neck supple. No thyromegaly present. Carotid bruit absent   Cardiovascular: Normal rate, regular rhythm and intact distal pulses. Exam reveals no gallop and no friction rub. No murmur heard. Pulmonary/Chest: Effort normal and breath sounds normal. No respiratory distress. Abdominal: Soft. Musculoskeletal: She exhibits no edema. Lymphadenopathy:     She has no cervical adenopathy. Neurological: She is alert and oriented to person, place, and time. Skin: Skin is warm and dry. Psychiatric: She has a normal mood and affect. Her behavior is normal. Judgment and thought content normal.   Nursing note and vitals reviewed. ASSESSMENT and PLAN    ICD-10-CM ICD-9-CM    1. HTN, goal below 130/80 I10 401.9    2. DM type 2, uncontrolled, with neuropathy (HCC) E11.40 250.62 glipiZIDE (GLUCOTROL) 10 mg tablet    E11.65 357.2    3. Microalbuminuria R80.9 791.0    4. Primary insomnia F51.01 307.42 zolpidem (AMBIEN) 10 mg tablet   5. Left hip pain M25.552 719.45 traMADol (ULTRAM) 50 mg tablet   6. Type II diabetes mellitus with nephropathy (HCC) E11.21 250.40      583.81    7. Long term current use of therapeutic drug Z79.899 V58.69 naloxone (NARCAN) 4 mg/actuation nasal spray     Follow-up Disposition:  Return in about 4 weeks (around 7/11/2018). the following changes in treatment are made: Stop Norvasc, as her BP is okay without it. Decrease Glipizide to daily instead of bid to avoid evening hypoglycemia.  reviewed, no worrisome findings. Stop Klonopin in favor of Ambien. Tramadol (7 day supply). Narcan prescription provided. very strongly urged to quit smoking to reduce cardiovascular risk  reviewed medications and side effects in detail  Plan of care reviewed - patient verbalize(s) understanding and agreement.

## 2018-06-13 NOTE — MR AVS SNAPSHOT
Britany Marroquin 
 
 
 14 [facility] [address] [facility] [address] 
[phone] Patient: Krys Post MRN: [id number] TONO:[id number] Visit Information Date & Time Provider Department Dept. Phone Encounter #  
 6/13/2018  8:00 AM Zaire Alejandre MD [employer] [phone] [id number] Follow-up Instructions Return in about 4 weeks (around 7/11/2018). Your Appointments 6/20/2018  2:15 PM  
ROUTINE CARE with MD James Lay [facility] ([facility]) Appt Note: 6 week appt for DM, HTN.  
 14 [facility] [address] [place] [address] [facility] [address]  
[phone]  
  
   
 14 [address] Upcoming Health Maintenance Date Due  
 LIPID PANEL Q1 1972 EYE EXAM RETINAL OR DILATED Q1 8/20/1982 Pneumococcal 19-64 Medium Risk (1 of 1 - PPSV23) 8/20/1991 DTaP/Tdap/Td series (1 - Tdap) 8/20/1993 PAP AKA CERVICAL CYTOLOGY 8/20/1993 Influenza Age 5 to Adult 8/1/2018 HEMOGLOBIN A1C Q6M 11/9/2018 FOOT EXAM Q1 4/12/2019 MICROALBUMIN Q1 4/12/2019 Allergies as of 6/13/2018  Review Complete On: 6/13/2018 By: Zaire Alejandre MD  
  
 Severity Noted Reaction Type Reactions Lyrica [Pregabalin] High 04/12/2018    Anaphylaxis  
 suicidal thoughts Ibuprofen  04/12/2018    Swelling  
 legs Trazodone  04/16/2018    Other (comments) Loose bowel movents Current Immunizations  Never Reviewed No immunizations on file. Not reviewed this visit You Were Diagnosed With   
  
 Codes Comments HTN, goal below 130/80    -  Primary ICD-10-CM: I10 
ICD-9-CM: 401.9 DM type 2, uncontrolled, with neuropathy ([facility] [address]     ICD-10-CM: E11.40, E11.65 ICD-9-CM: 250.62, 357.2 Microalbuminuria     ICD-10-CM: R80.9 ICD-9-CM: 791.0 Primary insomnia     ICD-10-CM: F51.01 
ICD-9-CM: 307.42 Left hip pain     ICD-10-CM: M25.552 ICD-9-CM: 719.45 Type II diabetes mellitus with nephropathy (HCC)     ICD-10-CM: E11.21 
ICD-9-CM: 250.40, 583.81 Long term current use of therapeutic drug     ICD-10-CM: Z79.899 ICD-9-CM: V58.69 Vitals BP Pulse Temp Resp Height(growth percentile) Weight(growth percentile) 129/86 (BP 1 Location: Right arm, BP Patient Position: Sitting) (!) 114 98.9 °F (37.2 °C) (Oral) 18 5' 9\" (1.753 m) 155 lb (70.3 kg) SpO2 BMI OB Status Smoking Status 99% 22.89 kg/m2 Having regular periods Current Every Day Smoker BMI and BSA Data Body Mass Index Body Surface Area  
 22.89 kg/m 2 1.85 m 2 Preferred Pharmacy Pharmacy Name Phone [id number] [place], [address] [facility] [phone] Your Updated Medication List  
  
   
This list is accurate as of 6/13/18  9:17 AM.  Always use your most recent med list.  
  
  
  
  
 diclofenac 1.3 % Pt12 Commonly known as:  FLECTOR  
1 Patch by TransDERmal route every twelve (12) hours every twelve (12) hours. DULoxetine 60 mg capsule Commonly known as:  CYMBALTA Take 1 Cap by mouth daily. glipiZIDE 10 mg tablet Commonly known as:  Lilliana Kitchen Take 1 Tab by mouth daily. Indications: type 2 diabetes mellitus  
  
 insulin glargine 100 unit/mL (3 mL) Inpn Commonly known as:  LANTUS SOLOSTAR U-100 INSULIN  
50 Units by SubCUTAneous route nightly. insulin nph-regular human rec 100 unit/mL (70-30) Inpn Commonly known as:  HumuLIN 70/30 U-100 KwikPen 15 Units by SubCUTAneous route two (2) times a day and at bedtime. naloxone 4 mg/actuation nasal spray Commonly known as:  [employer] Use 1 spray intranasally into 1 nostril. Use a new Narcan nasal spray for subsequent doses and administer into alternating nostrils. May repeat every 2 to 3 minutes as needed. traMADol 50 mg tablet Commonly known as:  Yair Woodruff  
 Take 1 Tab by mouth every eight (8) hours as needed for Pain. Max Daily Amount: 150 mg. Indications: Pain  
  
 triamcinolone acetonide 0.1 % ointment Commonly known as:  KENALOG Apply  to affected area two (2) times a day. use thin layer  
  
 zolpidem 10 mg tablet Commonly known as:  AMBIEN Take 1 Tab by mouth nightly as needed for Sleep. Max Daily Amount: 10 mg. Indications: SLEEP-ONSET INSOMNIA Prescriptions Printed Refills  
 naloxone (NARCAN) 4 mg/actuation nasal spray 11 Sig: Use 1 spray intranasally into 1 nostril. Use a new Narcan nasal spray for subsequent doses and administer into alternating nostrils. May repeat every 2 to 3 minutes as needed. Class: Print  
 traMADol (ULTRAM) 50 mg tablet 1 Sig: Take 1 Tab by mouth every eight (8) hours as needed for Pain. Max Daily Amount: 150 mg. Indications: Pain Class: Print Route: Oral  
 zolpidem (AMBIEN) 10 mg tablet 2 Sig: Take 1 Tab by mouth nightly as needed for Sleep. Max Daily Amount: 10 mg. Indications: SLEEP-ONSET INSOMNIA Class: Print Route: Oral  
  
Follow-up Instructions Return in about 4 weeks (around 7/11/2018). Introducing [facility] & HEALTH SERVICES! [facility] introduces [employer] patient portal. Now you can access parts of your medical record, email your doctor's office, and request medication refills online. 1. In your internet browser, go to [email or web address] 2. Click on the First Time User? Click Here link in the Sign In box. You will see the New Member Sign Up page. 3. Enter your [employer] Access Code exactly as it appears below. You will not need to use this code after youve completed the sign-up process. If you do not sign up before the expiration date, you must request a new code. · [employer] Access Code: [email or web address] Expires: 7/5/2018 12:26 AM 
 
4.  Enter the last four digits of your Social Security Number (xxxx) and Date of Birth (mm/dd/yyyy) as indicated and click Submit. You will be taken to the next sign-up page. 5. Create a ePig Games ID. This will be your [employer] login ID and cannot be changed, so think of one that is secure and easy to remember. 6. Create a [employer] password. You can change your password at any time. 7. Enter your Password Reset Question and Answer. This can be used at a later time if you forget your password. 8. Enter your e-mail address. You will receive e-mail notification when new information is available in [address] 9. Click Sign Up. You can now view and download portions of your medical record. 10. Click the Download Summary menu link to download a portable copy of your medical information. If you have questions, please visit the Frequently Asked Questions section of the [employer] website. Remember, [employer] is NOT to be used for urgent needs. For medical emergencies, dial 911. Now available from your iPhone and Android! Please provide this summary of care documentation to your next provider. Your primary care clinician is listed as Mandy Alvarado. If you have any questions after today's visit, please call [phone].

## 2018-07-11 ENCOUNTER — OFFICE VISIT (OUTPATIENT)
Dept: FAMILY MEDICINE CLINIC | Facility: CLINIC | Age: 48
End: 2018-07-11

## 2018-07-11 VITALS
SYSTOLIC BLOOD PRESSURE: 139 MMHG | HEIGHT: 69 IN | RESPIRATION RATE: 18 BRPM | DIASTOLIC BLOOD PRESSURE: 91 MMHG | OXYGEN SATURATION: 99 % | WEIGHT: 156.4 LBS | TEMPERATURE: 97.9 F | BODY MASS INDEX: 23.16 KG/M2 | HEART RATE: 116 BPM

## 2018-07-11 DIAGNOSIS — R80.9 MICROALBUMINURIA: ICD-10-CM

## 2018-07-11 DIAGNOSIS — I10 HTN, GOAL BELOW 130/80: Primary | ICD-10-CM

## 2018-07-11 DIAGNOSIS — M54.42 ACUTE LEFT-SIDED LOW BACK PAIN WITH LEFT-SIDED SCIATICA: ICD-10-CM

## 2018-07-11 DIAGNOSIS — F51.01 PRIMARY INSOMNIA: ICD-10-CM

## 2018-07-11 RX ORDER — PREDNISONE 20 MG/1
40 TABLET ORAL
Qty: 14 TAB | Refills: 0 | Status: SHIPPED | OUTPATIENT
Start: 2018-07-11 | End: 2018-07-18

## 2018-07-11 RX ORDER — DICLOFENAC SODIUM 30 MG/G
GEL TOPICAL 2 TIMES DAILY
Qty: 100 G | Refills: 11 | Status: SHIPPED | OUTPATIENT
Start: 2018-07-11

## 2018-07-11 RX ORDER — TEMAZEPAM 30 MG/1
30 CAPSULE ORAL
Qty: 30 CAP | Refills: 5 | Status: SHIPPED | OUTPATIENT
Start: 2018-07-11

## 2018-07-11 NOTE — PROGRESS NOTES
HISTORY OF PRESENT ILLNESS  Jean-Claude Gore is a 41 y.o. female. HPI Comments: Seen in follow-up for HTN, diabetes with neuropathy/microalbuminuria, insomnia. Ambien isn't working for sleep - had trouble with Trazodone. She never saw Ortho (wants to wait until she has insurance, but has no idea when this will happen). She says she had a Pap smear 2 years ago in [place]. She isn't having hypoglycemia any more. Fall   Associated symptoms include tingling (feet). Pertinent negatives include no fever, no nausea, no vomiting and no headaches. Past Medical History:   Diagnosis Date    Anxiety     Diabetes ([facility] [address]     Miscarriage     x 2    Neuropathy     Stillborn, abnormal        History reviewed. No pertinent surgical history. History   Smoking Status    Current Every Day Smoker   Smokeless Tobacco    Never Used     Current Outpatient Prescriptions   Medication Sig    naloxone (NARCAN) 4 mg/actuation nasal spray Use 1 spray intranasally into 1 nostril. Use a new Narcan nasal spray for subsequent doses and administer into alternating nostrils. May repeat every 2 to 3 minutes as needed.  glipiZIDE (GLUCOTROL) 10 mg tablet Take 1 Tab by mouth daily. Indications: type 2 diabetes mellitus    traMADol (ULTRAM) 50 mg tablet Take 1 Tab by mouth every eight (8) hours as needed for Pain. Max Daily Amount: 150 mg. Indications: Pain    zolpidem (AMBIEN) 10 mg tablet Take 1 Tab by mouth nightly as needed for Sleep. Max Daily Amount: 10 mg. Indications: SLEEP-ONSET INSOMNIA    diclofenac (FLECTOR) 1.3 % pt12 1 Patch by TransDERmal route every twelve (12) hours every twelve (12) hours.  insulin nph-regular human rec (HUMULIN 70/30 U-100 KWIKPEN) 100 unit/mL (70-30) inpn 15 Units by SubCUTAneous route two (2) times a day and at bedtime.  (Patient taking differently: 15 Units by SubCUTAneous route two (2) times a day.)    insulin glargine (LANTUS SOLOSTAR U-100 INSULIN) 100 unit/mL (3 mL) inpn 50 Units by SubCUTAneous route nightly.  DULoxetine (CYMBALTA) 60 mg capsule Take 1 Cap by mouth daily.  triamcinolone acetonide (KENALOG) 0.1 % ointment Apply  to affected area two (2) times a day. use thin layer     No current facility-administered medications for this visit. Review of Systems   Constitutional: Negative for chills and fever. Respiratory: Negative for shortness of breath. Cardiovascular: Positive for leg swelling (occasional). Negative for chest pain and palpitations. Gastrointestinal: Negative for nausea and vomiting. Musculoskeletal: Positive for back pain. Neurological: Positive for tingling (feet). Negative for focal weakness and headaches. Psychiatric/Behavioral: The patient has insomnia. Visit Vitals    BP (!) 139/91    Pulse (!) 116    Temp 97.9 °F (36.6 °C) (Oral)    Resp 18    Ht 5' 9\" (1.753 m)    Wt 156 lb 6.4 oz (70.9 kg)    LMP 07/10/2018    SpO2 99%    BMI 23.1 kg/m2       Physical Exam   Constitutional: She is oriented to person, place, and time. She appears well-developed and well-nourished. No distress. Neck: Neck supple. No thyromegaly present. Carotid bruit absent   Cardiovascular: Normal rate, regular rhythm and intact distal pulses. Exam reveals no gallop and no friction rub. No murmur heard. Pulmonary/Chest: Effort normal and breath sounds normal. No respiratory distress. Musculoskeletal: She exhibits no edema. Lymphadenopathy:     She has no cervical adenopathy. Neurological: She is alert and oriented to person, place, and time. Skin: Skin is warm and dry. Psychiatric: She has a normal mood and affect. Her behavior is normal. Judgment and thought content normal.   Nursing note and vitals reviewed. ASSESSMENT and PLAN    ICD-10-CM ICD-9-CM    1. HTN, goal below 130/80 I10 401.9    2. DM type 2, uncontrolled, with neuropathy ([facility] [address] E11.40 250.62     E11.65 357.2    3. Microalbuminuria R80.9 791.0    4.  Primary insomnia F51.01 307.42 temazepam (RESTORIL) 30 mg capsule   5. Acute left-sided low back pain with left-sided sciatica M54.42 724.2 predniSONE (DELTASONE) 20 mg tablet     724.3 diclofenac (SOLARAZE) 3 % topical gel     Follow-up Disposition:  Return in about 3 months (around 10/11/2018). the following changes in treatment are made: Steroid pulse, trial of Diclofenac gel. Trial of Restoril for insomnia. very strongly urged to quit smoking to reduce cardiovascular risk  reviewed medications and side effects in detail  Plan of care reviewed - patient verbalize(s) understanding and agreement.

## 2018-07-11 NOTE — MR AVS SNAPSHOT
[address] [place] [address] 1 [facility] [address] 
[phone] Patient: Michael Smith MRN: [id number] FZP:[id number] Visit Information Date & Time Provider Department Dept. Phone Encounter #  
 7/11/2018  2:00 PM Corey Catalan MD [facility] [phone] [id number] Follow-up Instructions Return in about 3 months (around 10/11/2018). Upcoming Health Maintenance Date Due  
 LIPID PANEL Q1 1972 EYE EXAM RETINAL OR DILATED Q1 8/20/1982 Pneumococcal 19-64 Medium Risk (1 of 1 - PPSV23) 8/20/1991 DTaP/Tdap/Td series (1 - Tdap) 8/20/1993 PAP AKA CERVICAL CYTOLOGY 7/31/2019* Influenza Age 5 to Adult 8/1/2018 HEMOGLOBIN A1C Q6M 11/9/2018 FOOT EXAM Q1 4/12/2019 MICROALBUMIN Q1 4/12/2019 *Topic was postponed. The date shown is not the original due date. Allergies as of 7/11/2018  Review Complete On: 7/11/2018 By: Corey Catalan MD  
  
 Severity Noted Reaction Type Reactions Lyrica [Pregabalin] High 04/12/2018    Anaphylaxis  
 suicidal thoughts Ibuprofen  04/12/2018    Swelling  
 legs Trazodone  04/16/2018    Other (comments) Loose bowel movents Current Immunizations  Never Reviewed No immunizations on file. Not reviewed this visit You Were Diagnosed With   
  
 Codes Comments HTN, goal below 130/80    -  Primary ICD-10-CM: I10 
ICD-9-CM: 401.9 DM type 2, uncontrolled, with neuropathy ([facility] [address]     ICD-10-CM: E11.40, E11.65 ICD-9-CM: 250.62, 357.2 Microalbuminuria     ICD-10-CM: R80.9 ICD-9-CM: 791.0 Primary insomnia     ICD-10-CM: F51.01 
ICD-9-CM: 307.42 Acute left-sided low back pain with left-sided sciatica     ICD-10-CM: M54.42 
ICD-9-CM: 724.2, 724.3 Vitals BP Pulse Temp Resp Height(growth percentile) Weight(growth percentile) (!) 139/91 (!) 116 97.9 °F (36.6 °C) (Oral) 18 5' 9\" (1.753 m) 156 lb 6.4 oz (70.9 kg) LMP SpO2 BMI OB Status Smoking Status 07/10/2018 99% 23.1 kg/m2 Having regular periods Current Every Day Smoker Vitals History BMI and BSA Data Body Mass Index Body Surface Area  
 23.1 kg/m 2 1.86 m 2 Preferred Pharmacy Pharmacy Name Phone [facility] Raymon Salazar, [address] [phone] Your Updated Medication List  
  
   
This list is accurate as of 7/11/18  3:12 PM.  Always use your most recent med list.  
  
  
  
  
 diclofenac 1.3 % Pt12 Commonly known as:  FLECTOR  
1 Patch by TransDERmal route every twelve (12) hours every twelve (12) hours. diclofenac 3 % topical gel Commonly known as:  Dorothy Lango Apply  to affected area two (2) times a day. DULoxetine 60 mg capsule Commonly known as:  CYMBALTA Take 1 Cap by mouth daily. glipiZIDE 10 mg tablet Commonly known as:  Schneider Helling Take 1 Tab by mouth daily. Indications: type 2 diabetes mellitus  
  
 insulin glargine 100 unit/mL (3 mL) Inpn Commonly known as:  LANTUS SOLOSTAR U-100 INSULIN  
50 Units by SubCUTAneous route nightly. insulin nph-regular human rec 100 unit/mL (70-30) Inpn Commonly known as:  HumuLIN 70/30 U-100 KwikPen 15 Units by SubCUTAneous route two (2) times a day and at bedtime. naloxone 4 mg/actuation nasal spray Commonly known as:  [employer] Use 1 spray intranasally into 1 nostril. Use a new Narcan nasal spray for subsequent doses and administer into alternating nostrils. May repeat every 2 to 3 minutes as needed. predniSONE 20 mg tablet Commonly known as:  Peter Dally Take 2 Tabs by mouth daily (with breakfast) for 7 days. temazepam 30 mg capsule Commonly known as:  RESTORIL Take 1 Cap by mouth nightly as needed for Sleep. Max Daily Amount: 30 mg. Indications: Insomnia  
  
 traMADol 50 mg tablet Commonly known as:  ULTRAM  
Take 1 Tab by mouth every eight (8) hours as needed for Pain.  Max Daily Amount: 150 mg. Indications: Pain  
  
 triamcinolone acetonide 0.1 % ointment Commonly known as:  KENALOG Apply  to affected area two (2) times a day. use thin layer Prescriptions Printed Refills  
 temazepam (RESTORIL) 30 mg capsule 5 Sig: Take 1 Cap by mouth nightly as needed for Sleep. Max Daily Amount: 30 mg. Indications: Insomnia Class: Print Route: Oral  
  
Prescriptions Sent to Pharmacy Refills  
 predniSONE (DELTASONE) 20 mg tablet 0 Sig: Take 2 Tabs by mouth daily (with breakfast) for 7 days. Class: Normal  
 Pharmacy: [facility] [address] Ph #: [phone] Route: Oral  
 diclofenac (SOLARAZE) 3 % topical gel 11 Sig: Apply  to affected area two (2) times a day. Class: Normal  
 Pharmacy: [id number] Ph #: [phone] Route: Topical  
  
Follow-up Instructions Return in about 3 months (around 10/11/2018). Introducing [facility] & HEALTH SERVICES! [employer] introduces [employer] patient portal. Now you can access parts of your medical record, email your doctor's office, and request medication refills online. 1. In your internet browser, go to [email or web address] 2. Click on the First Time User? Click Here link in the Sign In box. You will see the New Member Sign Up page. 3. Enter your [employer] Access Code exactly as it appears below. You will not need to use this code after youve completed the sign-up process. If you do not sign up before the expiration date, you must request a new code. · [employer] Access Code: [email or web address] Expires: 10/9/2018  3:12 PM 
 
4. Enter the last four digits of your Social Security Number (xxxx) and Date of Birth (mm/dd/yyyy) as indicated and click Submit. You will be taken to the next sign-up page. 5. Create a [employer] ID.  This will be your [employer] login ID and cannot be changed, so think of one that is secure and easy to remember. 6. Create a [employer] password. You can change your password at any time. 7. Enter your Password Reset Question and Answer. This can be used at a later time if you forget your password. 8. Enter your e-mail address. You will receive e-mail notification when new information is available in [address] 9. Click Sign Up. You can now view and download portions of your medical record. 10. Click the Download Summary menu link to download a portable copy of your medical information. If you have questions, please visit the Frequently Asked Questions section of the [employer] website. Remember, [employer] is NOT to be used for urgent needs. For medical emergencies, dial 911. Now available from your iPhone and Android! Please provide this summary of care documentation to your next provider. Your primary care clinician is listed as Eugenia Babb. If you have any questions after today's visit, please call [phone].

## 2018-07-11 NOTE — PROGRESS NOTES
Chief Complaint   Patient presents with   [address] [facility]     patient states that she fell down the stairs a couple of months ago an is having a lot of hip and back pain       Pt preferred language for health care discussion is Ferdinand Lanier. Is someone accompanying this pt? no    Is the patient using any DME equipment during OV? no    Pt currently taking Antiplatelet therapy? no    Depression Screening:  PHQ over the last two weeks 7/11/2018 6/13/2018 5/9/2018 4/16/2018 4/12/2018   PHQ Not Done - - - Active Diagnosis of Depression or Bipolar Disorder -   Little interest or pleasure in doing things Not at all Several days Not at all - Several days   Feeling down, depressed or hopeless Not at all Several days Several days - Several days   Total Score PHQ 2 0 2 1 - 2       Learning Assessment:  Learning Assessment 6/13/2018   PRIMARY LEARNER Patient   PRIMARY LANGUAGE ENGLISH   LEARNER PREFERENCE PRIMARY DEMONSTRATION   ANSWERED BY patient   RELATIONSHIP SELF       Abuse Screening:  Abuse Screening Questionnaire 7/11/2018   Do you ever feel afraid of your partner? N   Are you in a relationship with someone who physically or mentally threatens you? N   Is it safe for you to go home? Y       Health Maintenance reviewed, discussed with patient and ordered per Provider. Health Maintenance Due   Topic Date Due    LIPID PANEL Q1  1972    EYE EXAM RETINAL OR DILATED Q1  08/20/1982    Pneumococcal 19-64 Medium Risk (1 of 1 - PPSV23) 08/20/1991    DTaP/Tdap/Td series (1 - Tdap) 08/20/1993    PAP AKA CERVICAL CYTOLOGY  08/20/1993       Any and all required MATTHEW forms filled out by patient and faxed, confirmation received. Coordination of Care:  1. Have you been to the ER, urgent care clinic since your last visit? Hospitalized in the last 30 days? no    2. Have you seen or consulted any other health care providers outside of the [address] [place] [address] in the last 30 days? Include any pap smears or colon screening. no